# Patient Record
Sex: MALE | Race: WHITE | NOT HISPANIC OR LATINO | URBAN - METROPOLITAN AREA
[De-identification: names, ages, dates, MRNs, and addresses within clinical notes are randomized per-mention and may not be internally consistent; named-entity substitution may affect disease eponyms.]

---

## 2017-01-12 ENCOUNTER — INPATIENT (INPATIENT)
Facility: HOSPITAL | Age: 67
LOS: 3 days | Discharge: HOME HEALTH | DRG: 465 | End: 2017-01-16
Attending: STUDENT IN AN ORGANIZED HEALTH CARE EDUCATION/TRAINING PROGRAM | Admitting: STUDENT IN AN ORGANIZED HEALTH CARE EDUCATION/TRAINING PROGRAM
Payer: COMMERCIAL

## 2017-01-12 VITALS
RESPIRATION RATE: 18 BRPM | OXYGEN SATURATION: 98 % | DIASTOLIC BLOOD PRESSURE: 81 MMHG | HEART RATE: 92 BPM | SYSTOLIC BLOOD PRESSURE: 158 MMHG | TEMPERATURE: 99 F | HEIGHT: 75 IN | WEIGHT: 259.93 LBS

## 2017-01-12 DIAGNOSIS — I48.91 UNSPECIFIED ATRIAL FIBRILLATION: ICD-10-CM

## 2017-01-12 DIAGNOSIS — R60.0 LOCALIZED EDEMA: ICD-10-CM

## 2017-01-12 DIAGNOSIS — R63.8 OTHER SYMPTOMS AND SIGNS CONCERNING FOOD AND FLUID INTAKE: ICD-10-CM

## 2017-01-12 DIAGNOSIS — I10 ESSENTIAL (PRIMARY) HYPERTENSION: ICD-10-CM

## 2017-01-12 DIAGNOSIS — G62.9 POLYNEUROPATHY, UNSPECIFIED: ICD-10-CM

## 2017-01-12 DIAGNOSIS — Z41.8 ENCOUNTER FOR OTHER PROCEDURES FOR PURPOSES OTHER THAN REMEDYING HEALTH STATE: ICD-10-CM

## 2017-01-12 DIAGNOSIS — L97.514 NON-PRESSURE CHRONIC ULCER OF OTHER PART OF RIGHT FOOT WITH NECROSIS OF BONE: ICD-10-CM

## 2017-01-12 DIAGNOSIS — L03.039 CELLULITIS OF UNSPECIFIED TOE: ICD-10-CM

## 2017-01-12 LAB
ALBUMIN SERPL ELPH-MCNC: 3.3 G/DL — LOW (ref 3.4–5)
ALP SERPL-CCNC: 79 U/L — SIGNIFICANT CHANGE UP (ref 40–120)
ALT FLD-CCNC: 27 U/L — SIGNIFICANT CHANGE UP (ref 12–42)
ANION GAP SERPL CALC-SCNC: 8 MMOL/L — LOW (ref 9–16)
APTT BLD: 35.9 SEC — SIGNIFICANT CHANGE UP (ref 27.5–37.4)
AST SERPL-CCNC: 15 U/L — SIGNIFICANT CHANGE UP (ref 15–37)
BASOPHILS NFR BLD AUTO: 0.3 % — SIGNIFICANT CHANGE UP (ref 0–2)
BILIRUB SERPL-MCNC: 0.4 MG/DL — SIGNIFICANT CHANGE UP (ref 0.2–1.2)
BLD GP AB SCN SERPL QL: NEGATIVE — SIGNIFICANT CHANGE UP
BUN SERPL-MCNC: 18 MG/DL — SIGNIFICANT CHANGE UP (ref 7–23)
CALCIUM SERPL-MCNC: 9.2 MG/DL — SIGNIFICANT CHANGE UP (ref 8.5–10.5)
CHLORIDE SERPL-SCNC: 104 MMOL/L — SIGNIFICANT CHANGE UP (ref 96–108)
CO2 SERPL-SCNC: 26 MMOL/L — SIGNIFICANT CHANGE UP (ref 22–31)
CREAT SERPL-MCNC: 0.99 MG/DL — SIGNIFICANT CHANGE UP (ref 0.5–1.3)
EOSINOPHIL NFR BLD AUTO: 1.5 % — SIGNIFICANT CHANGE UP (ref 0–6)
GLUCOSE SERPL-MCNC: 145 MG/DL — HIGH (ref 70–99)
HCT VFR BLD CALC: 38.5 % — LOW (ref 39–50)
HGB BLD-MCNC: 13.4 G/DL — SIGNIFICANT CHANGE UP (ref 13–17)
INR BLD: 2.47 — HIGH (ref 0.88–1.16)
LYMPHOCYTES # BLD AUTO: 14.6 % — SIGNIFICANT CHANGE UP (ref 13–44)
MCHC RBC-ENTMCNC: 30.9 PG — SIGNIFICANT CHANGE UP (ref 27–34)
MCHC RBC-ENTMCNC: 34.8 G/DL — SIGNIFICANT CHANGE UP (ref 32–36)
MCV RBC AUTO: 88.7 FL — SIGNIFICANT CHANGE UP (ref 80–100)
MONOCYTES NFR BLD AUTO: 9.2 % — SIGNIFICANT CHANGE UP (ref 2–14)
NEUTROPHILS NFR BLD AUTO: 74.4 % — SIGNIFICANT CHANGE UP (ref 43–77)
PLATELET # BLD AUTO: 208 K/UL — SIGNIFICANT CHANGE UP (ref 150–400)
POTASSIUM SERPL-MCNC: 4.3 MMOL/L — SIGNIFICANT CHANGE UP (ref 3.5–5.3)
POTASSIUM SERPL-SCNC: 4.3 MMOL/L — SIGNIFICANT CHANGE UP (ref 3.5–5.3)
PROT SERPL-MCNC: 7.4 G/DL — SIGNIFICANT CHANGE UP (ref 6.4–8.2)
PROTHROM AB SERPL-ACNC: 27.7 SEC — HIGH (ref 10–13.1)
RBC # BLD: 4.34 M/UL — SIGNIFICANT CHANGE UP (ref 4.2–5.8)
RBC # FLD: 12.5 % — SIGNIFICANT CHANGE UP (ref 10.3–16.9)
RH IG SCN BLD-IMP: POSITIVE — SIGNIFICANT CHANGE UP
SODIUM SERPL-SCNC: 138 MMOL/L — SIGNIFICANT CHANGE UP (ref 135–145)
WBC # BLD: 12 K/UL — HIGH (ref 3.8–10.5)
WBC # FLD AUTO: 12 K/UL — HIGH (ref 3.8–10.5)

## 2017-01-12 PROCEDURE — 99285 EMERGENCY DEPT VISIT HI MDM: CPT | Mod: 25

## 2017-01-12 PROCEDURE — 99222 1ST HOSP IP/OBS MODERATE 55: CPT | Mod: GC

## 2017-01-12 PROCEDURE — 93971 EXTREMITY STUDY: CPT | Mod: 26

## 2017-01-12 PROCEDURE — 71020: CPT | Mod: 26

## 2017-01-12 PROCEDURE — 93010 ELECTROCARDIOGRAM REPORT: CPT | Mod: NC

## 2017-01-12 PROCEDURE — 73660 X-RAY EXAM OF TOE(S): CPT | Mod: 26,RT

## 2017-01-12 PROCEDURE — 73660 X-RAY EXAM OF TOE(S): CPT | Mod: 26

## 2017-01-12 RX ORDER — SODIUM CHLORIDE 9 MG/ML
3 INJECTION INTRAMUSCULAR; INTRAVENOUS; SUBCUTANEOUS ONCE
Qty: 0 | Refills: 0 | Status: COMPLETED | OUTPATIENT
Start: 2017-01-12 | End: 2017-01-12

## 2017-01-12 RX ORDER — VANCOMYCIN HCL 1 G
750 VIAL (EA) INTRAVENOUS ONCE
Qty: 0 | Refills: 0 | Status: COMPLETED | OUTPATIENT
Start: 2017-01-13 | End: 2017-01-13

## 2017-01-12 RX ORDER — WARFARIN SODIUM 2.5 MG/1
7.5 TABLET ORAL ONCE
Qty: 0 | Refills: 0 | Status: COMPLETED | OUTPATIENT
Start: 2017-01-12 | End: 2017-01-12

## 2017-01-12 RX ORDER — PIPERACILLIN AND TAZOBACTAM 4; .5 G/20ML; G/20ML
3.38 INJECTION, POWDER, LYOPHILIZED, FOR SOLUTION INTRAVENOUS EVERY 6 HOURS
Qty: 0 | Refills: 0 | Status: DISCONTINUED | OUTPATIENT
Start: 2017-01-12 | End: 2017-01-16

## 2017-01-12 RX ORDER — PIPERACILLIN AND TAZOBACTAM 4; .5 G/20ML; G/20ML
3.38 INJECTION, POWDER, LYOPHILIZED, FOR SOLUTION INTRAVENOUS ONCE
Qty: 0 | Refills: 0 | Status: COMPLETED | OUTPATIENT
Start: 2017-01-12 | End: 2017-01-12

## 2017-01-12 RX ORDER — LISINOPRIL 2.5 MG/1
20 TABLET ORAL DAILY
Qty: 0 | Refills: 0 | Status: DISCONTINUED | OUTPATIENT
Start: 2017-01-12 | End: 2017-01-13

## 2017-01-12 RX ORDER — VANCOMYCIN HCL 1 G
1500 VIAL (EA) INTRAVENOUS EVERY 12 HOURS
Qty: 0 | Refills: 0 | Status: DISCONTINUED | OUTPATIENT
Start: 2017-01-12 | End: 2017-01-13

## 2017-01-12 RX ORDER — VANCOMYCIN HCL 1 G
1000 VIAL (EA) INTRAVENOUS ONCE
Qty: 0 | Refills: 0 | Status: COMPLETED | OUTPATIENT
Start: 2017-01-12 | End: 2017-01-12

## 2017-01-12 RX ADMIN — PIPERACILLIN AND TAZOBACTAM 200 GRAM(S): 4; .5 INJECTION, POWDER, LYOPHILIZED, FOR SOLUTION INTRAVENOUS at 19:52

## 2017-01-12 RX ADMIN — SODIUM CHLORIDE 3 MILLILITER(S): 9 INJECTION INTRAMUSCULAR; INTRAVENOUS; SUBCUTANEOUS at 13:51

## 2017-01-12 RX ADMIN — PIPERACILLIN AND TAZOBACTAM 200 GRAM(S): 4; .5 INJECTION, POWDER, LYOPHILIZED, FOR SOLUTION INTRAVENOUS at 14:18

## 2017-01-12 RX ADMIN — Medication 250 MILLIGRAM(S): at 15:57

## 2017-01-12 NOTE — H&P ADULT. - PROBLEM SELECTOR PLAN 1
-Afebrile, VSS. WBC 12, ESR 36, CRP 8.40. On exam, second metatarsal with open ulcer (1 cm x 1cm), probes to bone, no purulence or bloody drainage. Podiatry on board, was debrided earlier in the day -Afebrile, VSS. WBC 12, ESR 36, CRP 8.40. On exam, second metatarsal with open ulcer (1 cm x 1cm), probes to bone, no purulence or bloody drainage. Podiatry on board, was debrided earlier in the day. XR R toe concerning for possible osteo of distal phalanx. S/o vanco 1g x1 and zosyn 3.375 x1 in ED  -f/u toe MRI to r/o osteo  -c/w empiric vanc, zosyn for now   -podiatry on board, appreciate recs  -f/u BCx  -f/u PT eval  -f/u daily CBC, ESR, CRP -Afebrile, VSS. WBC 12, ESR 36, CRP 8.40. On exam, second metatarsal with open ulcer (1 cm x 1cm), probes to bone, no purulence or bloody drainage. Podiatry on board, was debrided earlier in the day. XR R toe concerning for possible osteo of distal phalanx. S/o vanco 1g x1 and zosyn 3.375 x1 in ED  -high suspicion for osteo at this time  -f/u toe MRI to r/o osteo  -c/w empiric vanc/zosyn for now given chronicity of lesion and concern for osteo  -podiatry on board, appreciate recs  -f/u BCx  -f/u PT eval  -f/u daily CBC, ESR, CRP

## 2017-01-12 NOTE — H&P ADULT. - PROBLEM SELECTOR PLAN 2
-patient with non pitting edema to R calf. Mild erythema of R calf, with mild tenderness on palpation, 2+ pt and dp pulses  -given unilateral nature of LE edema, concern for possible DVT  -f/u duplex RLE

## 2017-01-12 NOTE — CONSULT NOTE ADULT - PROBLEM SELECTOR RECOMMENDATION 9
- Patient seen and evaluated at bedside where excisional debridement of devitalized epidermis was performed using aseptic technique and dissecting scissors and #15 blade down to dermis.  Good bleeding was present at ulcer edges post debridement. Patient tolerated the procedure well without incident.  Wound site was flushed with copious amounts of saline and cultures were obtained. Betadine was applied to wound site along with DSD and ACE.   - Radiographic / clinical findings suggestive of right 2nd digit distal phalanx OM.  -Recommend MRI for more definitive results to r/o OM to distal phalanx      - Recommend continuing empiric antibiotic therapy until cultures dictate otherwise.   -Recommend duplex ultrasound on right calf to r/o DVT as it is tender and swollen on clinical exam.   Patient exhibited adequate verbal understanding. Patient will require either conservative (6 weeks of IV antibiotics) management or surgical management.    - Attending physician to round on patient tomorrow evening to review treatment options.    - Podiatry to follow. - Patient seen and evaluated at bedside where excisional debridement of devitalized epidermis was performed using aseptic technique and dissecting scissors and #15 blade down to dermis.  Good bleeding was present at ulcer edges post debridement. Patient tolerated the procedure well without incident.  Wound site was flushed with copious amounts of saline and cultures were obtained. Betadine was applied to wound site along with DSD and ACE.   - Radiographic / clinical findings suggestive of right 2nd digit distal phalanx OM.  -Recommend MRI for more definitive results to r/o OM to distal phalanx. In event of positive MRI findings for OM, it is still reasonable to treat patient with long term IV abx considering he has good perfusion to toe  - Recommend continuing empiric antibiotic therapy until cultures dictate otherwise.   -Recommend duplex ultrasound on right calf to r/o DVT as it is tender and swollen on clinical exam.   Patient exhibited adequate verbal understanding. Patient will require either conservative (6 weeks of IV antibiotics) management or surgical management.    - Attending physician to round on patient tomorrow evening to review treatment options.    - Podiatry to follow. - Patient seen and evaluated at bedside where excisional debridement of devitalized epidermis was performed using aseptic technique and dissecting scissors and #15 blade down to dermis.  Good bleeding was present at ulcer edges post debridement. Patient tolerated the procedure well without incident.  Wound site was flushed with copious amounts of saline and cultures were obtained. Betadine was applied to wound site along with DSD and ACE.   - Radiographic / clinical findings suggestive of right 2nd digit distal phalanx OM.  -Recommend MRI for more definitive results to r/o OM to distal phalanx. In event of positive MRI findings for OM, it is still reasonable to treat patient with long term IV abx considering he has good perfusion to toe  - Recommend continuing empiric antibiotic therapy until cultures dictate otherwise.   -Recommend duplex ultrasound on right calf to r/o DVT as it is tender and swollen on clinical exam.   Patient exhibited adequate verbal understanding. Patient will require either conservative (6 weeks of IV antibiotics) management or surgical management.    -WBAT in surgical shoe  - Attending physician to round on patient tomorrow evening to review treatment options.    - Podiatry to follow.

## 2017-01-12 NOTE — H&P ADULT. - PROBLEM SELECTOR PLAN 5
-DVT: HSQ q8hrs    Dispo:  -code status: full code  -dispo: admit to [facility] -patient w/ h/o afib, on coumadin 7.5 mg daily (except for 3.725 mg on Wednesdays and Sundays). INR on admission 2.47  -c/w home coumadin 7.5 mg daily  -f/u AM INR

## 2017-01-12 NOTE — H&P ADULT. - ASSESSMENT
67 yo M, PMH chronic ulcer on R 2nd metatarsal, Afib (on coumadin) presents from home 1/12 with subacute worsening of toe ulcer, exam and imaging concerning for possible osteomyelitis of right second metatarsal, podiatry on board. 67 yo M, PMH chronic ulcer on R 2nd distal phalanx, Afib (on coumadin), HTN presents from home 1/12 with subacute worsening of toe ulcer, exam and imaging concerning for possible osteomyelitis of right second distal phalanx, podiatry on board.

## 2017-01-12 NOTE — H&P ADULT. - PROBLEM SELECTOR PLAN 3
-patient w/ h/o afib, on coumadin 7.5 mg daily (except for 3.725 mg on Wednesdays and Sundays). INR on admission 2.47  -c/w home coumadin 7.5 mg daily  -f/u AM INR -patient w/ decreased sensation b/l feet, R>L. Neuro exam otherwise WNL. No hx of DM per patient  -f/u AM HgbA1C   -continue to monitor

## 2017-01-12 NOTE — H&P ADULT. - ATTENDING COMMENTS
pt seen and examined on 1/12/2017  reviewed h&p, ekg, vs, labs, xray imaging  pt admitted for right 2nd toe ulcer/ osteomyelitis w/ R lower ext edema/ mild erythema  agree w/ PE findings  a/p:   1. R 2nd toe ulcer/ osteomyelitis: on vancomycin and zosyn; follow up podiatry recs, MRI pending

## 2017-01-12 NOTE — H&P ADULT. - EXTREMITIES COMMENTS
RLE-non pitting edema to knee, mildly erythematous, mild TTP  R 2nd metatarsal- open ulcer (1 cm x 1cm), probes to bone, no purulence or bloody drainage, but was debrided by podiatry earlier today

## 2017-01-12 NOTE — CONSULT NOTE ADULT - SUBJECTIVE AND OBJECTIVE BOX
65 y/o Male with A-Fib (on Coumadin), peripheral neuropathy, history of right foot ulcerations and a previews hospitalization for infected foot wound sent today to ED by his podiatrist (Dr. Delgado) for an infected ulcer on right 2nd digit. Pt notes that he has had this ulceration since December which he has been treating with daily wound care (mupirocin and DSD). He notes that it has been getting bigger for the past week. He states that he has pain on his right calf but not much pain on his foot. Pt denies any purulent drainage, nausea, chills, fevers, SOB, chest pain.     Allergies: no known drug or food allergies  Family history: A-fib mother  Social history: patient works as a  from home, denies drinking, denies smoking  Surgical history: removal of rectal benign lesion  ROS: negative    ICU Vital Signs Last 24 Hrs  T(C): 37.3, Max: 37.3 (01-12 @ 14:53)  T(F): 99.2, Max: 99.2 (01-12 @ 14:53)  HR: 80 (80 - 92)  BP: 119/81 (119/81 - 158/81)  BP(mean): --  ABP: --  ABP(mean): --  RR: 22 (18 - 22)  SpO2: 97% (97% - 98%)                          13.4   12.0  )-----------( 208      ( 12 Jan 2017 13:58 )             38.5   12 Jan 2017 13:58    138    |  104    |  18     ----------------------------<  145    4.3     |  26     |  0.99     Ca    9.2        12 Jan 2017 13:58    TPro  7.4    /  Alb  3.3    /  TBili  0.4    /  DBili  x      /  AST  15     /  ALT  27     /  AlkPhos  79     12 Jan 2017 13:5    PT/INR - ( 12 Jan 2017 13:58 )   PT: 27.7 sec;   INR: 2.47       PTT - ( 12 Jan 2017 13:58 )  PTT:35.9 sec    ESR: 36  CRP: 8.4    INTERPRETATION:  XR TOE(S)-RIGHT MIN 2 VIEWS DATED 1/12/2017 2:15 PM    INDICATION: 66 years-old Male with 2nd toe inf r/o osteo.    PRIOR STUDIES: Right foot radiographs dated 11/8/2006.    FINDINGS: Three views of the right second toe are submitted. Soft tissue   swelling is noted about the second digit. Suggestion of a skin/soft   tissue defect at the tip of the second digit. Cannot rule out air in the   soft tissue, or most likely artifact due to air outlining the tip of the   nail. There is mild irregularity of the terminal phalangeal tuft of the   second distal phalanx. No acute fracture or dislocation. Mild productive   changes noted at the first MTP joint.    IMPRESSION: Irregularity of the terminal phalangeal tuft of the second   distal phalanx adjacent to a soft tissue defect. Findings are concerning   for osteomyelitis. MRI is recommended for further evaluation.    Physical Exam:  65yo pleasant well nourished male, awake, alert, oriented to examiner, sitting comfortable in chair in no acute distress  Surgical shoe on right foot with dressing intact on 2nd digit  Vascular: DP/PT 2/4 b/l, brisk capillary fill time x 10, TG reversed to right foot, pedal hair diminished  Neuro: protective sensation diminished b/l  Derm: R 2nd digit ulceration that measures approximately 1cm x 1cm, + probe to bone, + malodor, no purulence, surrounding erythema and edema going up the leg, + malodor, no fluctuance, no crepitation, fibrotic borders  Musculoskeletal: b/l rectus feet with rigid right 2nd digit hammertoe deformity

## 2017-01-12 NOTE — ED PROVIDER NOTE - ATTENDING CONTRIBUTION TO CARE
I assessed the patient and discussed with him the need for IV antibiotics and the need for admission for further care. Diabetic foot infection carries significant morbility if not aggressive managed.

## 2017-01-12 NOTE — ED ADULT NURSE NOTE - OBJECTIVE STATEMENT
pt is a 67 y/o male c/o infected right toe for the past two days. was sent by podiatrist for IV abx.  pt reports this is a recurring problem. [id number] ulceration bottom side of right second toe with mild amount of swelling. pt denies any pain at this time. denies fever, SOB, chills, chest pain, n/v/d, malaise. no acute distress, VS stable, ambulated to chair,  resting comfortably, will continue to monitor.

## 2017-01-12 NOTE — H&P ADULT. - PROBLEM SELECTOR PLAN 4
FEN:  -F: none  -E: replete PRN to maintain K>4, Mg>2  -N: DASH diet -patient w/ h/o HTN, on benazapril 20 mg at home. -159 while at [facility]  -c/w lisinopril 20 mg (therapeutic interchange for home benazapril)

## 2017-01-12 NOTE — ED PROVIDER NOTE - OBJECTIVE STATEMENT
The pt is a 67 y/o M, who presents to ED c/o R toe inf x few d. Pt states that has an ulcer to toe, has had an inf to same toe in past, The pt is a 67 y/o M, who presents to ED c/o R toe inf x few d. Pt states that has an ulcer to toe, has had an inf to same toe in past, has been cleaning and dressing but wound getting bigger - seen by own podiatrist today and sent to ED for iv abx. pt states swelling to 2nd toe, + pain, + purulent d/c. Denies fevers, chills, trauma, foot pain, decreased ROM, any other c/o

## 2017-01-12 NOTE — ED PROVIDER NOTE - MEDICAL DECISION MAKING DETAILS
pt w/cellulitis / infected toe ulcer, getting worse w/tx at home, xray neg for osteo, will admit for iv abx, podiatry to follow as inpatient, labs w/leukocytosis, cultures sent, abx started

## 2017-01-12 NOTE — CONSULT NOTE ADULT - ASSESSMENT
67yo  male with right foot 2nd digit infected ulceration secondary to hammer toe deformity and peripheral neuropathy

## 2017-01-12 NOTE — ED PROVIDER NOTE - MUSCULOSKELETAL, MLM
R foot: + swelling to 2nd digit, + ulcer to tip of toe, + erythema, + warmth, no involvement of metatarsals, FROM, pedal pulses = b/l, + light touch, no ascending lymphangitis R foot: + swelling to 2nd digit, + 0.5 cm ulcer to tip of toe, + erythema, + warmth, no involvement of metatarsals, FROM, pedal pulses = b/l, + light touch, no ascending lymphangitis

## 2017-01-12 NOTE — ED ADULT TRIAGE NOTE - CHIEF COMPLAINT QUOTE
Patient c/o rt foot pain  and swelling for 24 hrs , was sent by podiatrist for IV antibiotic . Denies any fever nor chills .

## 2017-01-13 DIAGNOSIS — Z01.810 ENCOUNTER FOR PREPROCEDURAL CARDIOVASCULAR EXAMINATION: ICD-10-CM

## 2017-01-13 DIAGNOSIS — I82.512 CHRONIC EMBOLISM AND THROMBOSIS OF LEFT FEMORAL VEIN: ICD-10-CM

## 2017-01-13 DIAGNOSIS — L97.519 NON-PRESSURE CHRONIC ULCER OF OTHER PART OF RIGHT FOOT WITH UNSPECIFIED SEVERITY: ICD-10-CM

## 2017-01-13 LAB
CRP SERPL-MCNC: 9.1 MG/DL — HIGH
ERYTHROCYTE [SEDIMENTATION RATE] IN BLOOD: 16 MM/HR — SIGNIFICANT CHANGE UP
GRAM STN FLD: SIGNIFICANT CHANGE UP
HCT VFR BLD CALC: 41.4 % — SIGNIFICANT CHANGE UP (ref 39–50)
HGB BLD-MCNC: 14.3 G/DL — SIGNIFICANT CHANGE UP (ref 13–17)
MCHC RBC-ENTMCNC: 31 PG — SIGNIFICANT CHANGE UP (ref 27–34)
MCHC RBC-ENTMCNC: 34.5 G/DL — SIGNIFICANT CHANGE UP (ref 32–36)
MCV RBC AUTO: 89.8 FL — SIGNIFICANT CHANGE UP (ref 80–100)
PLATELET # BLD AUTO: 222 K/UL — SIGNIFICANT CHANGE UP (ref 150–400)
RBC # BLD: 4.61 M/UL — SIGNIFICANT CHANGE UP (ref 4.2–5.8)
RBC # FLD: 12.5 % — SIGNIFICANT CHANGE UP (ref 10.3–16.9)
SPECIMEN SOURCE: SIGNIFICANT CHANGE UP
VANCOMYCIN TROUGH SERPL-MCNC: 12.1 UG/ML — SIGNIFICANT CHANGE UP (ref 10–20)
WBC # BLD: 11.8 K/UL — HIGH (ref 3.8–10.5)
WBC # FLD AUTO: 11.8 K/UL — HIGH (ref 3.8–10.5)

## 2017-01-13 PROCEDURE — 99232 SBSQ HOSP IP/OBS MODERATE 35: CPT

## 2017-01-13 PROCEDURE — 93010 ELECTROCARDIOGRAM REPORT: CPT

## 2017-01-13 RX ORDER — VANCOMYCIN HCL 1 G
VIAL (EA) INTRAVENOUS
Qty: 0 | Refills: 0 | Status: DISCONTINUED | OUTPATIENT
Start: 2017-01-13 | End: 2017-01-14

## 2017-01-13 RX ORDER — VANCOMYCIN HCL 1 G
1500 VIAL (EA) INTRAVENOUS EVERY 12 HOURS
Qty: 0 | Refills: 0 | Status: DISCONTINUED | OUTPATIENT
Start: 2017-01-14 | End: 2017-01-14

## 2017-01-13 RX ORDER — HEPARIN SODIUM 5000 [USP'U]/ML
5000 INJECTION INTRAVENOUS; SUBCUTANEOUS EVERY 8 HOURS
Qty: 0 | Refills: 0 | Status: DISCONTINUED | OUTPATIENT
Start: 2017-01-13 | End: 2017-01-13

## 2017-01-13 RX ORDER — NEBIVOLOL HYDROCHLORIDE 5 MG/1
5 TABLET ORAL DAILY
Qty: 0 | Refills: 0 | Status: DISCONTINUED | OUTPATIENT
Start: 2017-01-13 | End: 2017-01-16

## 2017-01-13 RX ORDER — VANCOMYCIN HCL 1 G
1500 VIAL (EA) INTRAVENOUS ONCE
Qty: 0 | Refills: 0 | Status: COMPLETED | OUTPATIENT
Start: 2017-01-13 | End: 2017-01-13

## 2017-01-13 RX ORDER — BENAZEPRIL HYDROCHLORIDE 40 MG/1
20 TABLET ORAL DAILY
Qty: 0 | Refills: 0 | Status: DISCONTINUED | OUTPATIENT
Start: 2017-01-13 | End: 2017-01-16

## 2017-01-13 RX ORDER — METOPROLOL TARTRATE 50 MG
50 TABLET ORAL DAILY
Qty: 0 | Refills: 0 | Status: DISCONTINUED | OUTPATIENT
Start: 2017-01-13 | End: 2017-01-13

## 2017-01-13 RX ADMIN — Medication 300 MILLIGRAM(S): at 07:28

## 2017-01-13 RX ADMIN — Medication 250 MILLIGRAM(S): at 21:37

## 2017-01-13 RX ADMIN — Medication 150 MILLIGRAM(S): at 00:13

## 2017-01-13 RX ADMIN — PIPERACILLIN AND TAZOBACTAM 200 GRAM(S): 4; .5 INJECTION, POWDER, LYOPHILIZED, FOR SOLUTION INTRAVENOUS at 06:32

## 2017-01-13 RX ADMIN — PIPERACILLIN AND TAZOBACTAM 200 GRAM(S): 4; .5 INJECTION, POWDER, LYOPHILIZED, FOR SOLUTION INTRAVENOUS at 12:57

## 2017-01-13 RX ADMIN — PIPERACILLIN AND TAZOBACTAM 200 GRAM(S): 4; .5 INJECTION, POWDER, LYOPHILIZED, FOR SOLUTION INTRAVENOUS at 17:27

## 2017-01-13 RX ADMIN — PIPERACILLIN AND TAZOBACTAM 200 GRAM(S): 4; .5 INJECTION, POWDER, LYOPHILIZED, FOR SOLUTION INTRAVENOUS at 01:42

## 2017-01-13 NOTE — PROGRESS NOTE ADULT - PROBLEM SELECTOR PLAN 8
CHADsVasc =1  was on warfarin for hx DVT , unsure if pt still needs a/c after 15 months of therapy  c/w bystolic

## 2017-01-13 NOTE — PROGRESS NOTE ADULT - SUBJECTIVE AND OBJECTIVE BOX
PGY-1 Medicine Progress Note    Overnight events: Admitted to medical floor.    Subjective: Pt seen and examined at bedside this morning.     T(C): 37.3, Max: 37.6 (01-12 @ 22:56)  HR: 85 (75 - 92)  BP: 115/71 (115/71 - 129/80)  RR: 20 (17 - 20)  SpO2: 97% (96% - 98%)  Wt(kg): --    CAPILLARY BLOOD GLUCOSE  152 (13 Jan 2017 16:42)  166 (13 Jan 2017 13:41)  121 (13 Jan 2017 07:44)    GEN: Alert, NAD, comfortable  HEENT: PERRL/EOMI, no LAD, no mass, no pharyngeal erythema or exudate  CV: irregularly irregular, S1/S2, no murmurs  RESP: CTA bilaterally, good respiratory effort  ABD: BS+, NTND, no guarding/rebound  EXTREMITIES: WWP, pulses 2+ in all 4 extremities, RLE pitting 2+ edema, second right toe with ulcer revealing bone, wrapped in dry bandage  NEURO: A&Ox3, decreased sensation to b/l feet, otherwise no focal deficits    MEDICATIONS  (STANDING):  piperacillin/tazobactam IVPB. 3.375Gram(s) IV Intermittent every 6 hours  nebivolol 5milliGRAM(s) Oral daily  benazepril 20milliGRAM(s) Oral daily  vancomycin  IVPB  IV Intermittent   vancomycin  IVPB 1500milliGRAM(s) IV Intermittent once    MEDICATIONS  (PRN):    Allergies    No Known Allergies    Intolerances PGY-1 Medicine Progress Note    Overnight events: Admitted to medical floor.    Subjective: Pt seen and examined at bedside this morning. Denies pain of toe, just redness/swelling. Otherwise denies fever, sob, chest pain, nausea/vomiting. Has good appetite, no change in bowel movements, has good urine output, and denies difficulty walking.    T(C): 37.3, Max: 37.6 (01-12 @ 22:56)  HR: 85 (75 - 92)  BP: 115/71 (115/71 - 129/80)  RR: 20 (17 - 20)  SpO2: 97% (96% - 98%)  Wt(kg): --    CAPILLARY BLOOD GLUCOSE  152 (13 Jan 2017 16:42)  166 (13 Jan 2017 13:41)  121 (13 Jan 2017 07:44)    GEN: Alert, NAD, comfortable  HEENT: PERRL/EOMI, no LAD, no mass, no pharyngeal erythema or exudate  CV: irregularly irregular, S1/S2, no murmurs  RESP: CTA bilaterally, good respiratory effort  ABD: BS+, NTND, no guarding/rebound  EXTREMITIES: WWP, pulses 2+ in all 4 extremities, RLE pitting 2+ edema, second right toe with ulcer revealing bone, wrapped in dry bandage  NEURO: A&Ox3, decreased sensation to b/l feet, otherwise no focal deficits    MEDICATIONS  (STANDING):  piperacillin/tazobactam IVPB. 3.375Gram(s) IV Intermittent every 6 hours  nebivolol 5milliGRAM(s) Oral daily  benazepril 20milliGRAM(s) Oral daily  vancomycin  IVPB  IV Intermittent   vancomycin  IVPB 1500milliGRAM(s) IV Intermittent once    MEDICATIONS  (PRN):    Allergies    No Known Allergies    Intolerances

## 2017-01-13 NOTE — PROGRESS NOTE ADULT - SUBJECTIVE AND OBJECTIVE BOX
Patient seen and examined for right 2nd toe ulcer, present for >1 month. Was seen by outside podiatrist Dr. Delgado yesterday, and sent to the ED for admission.     LE Exam: DP/PT palpable. CFT <3 sec to all toes. Protective sensation absent. There is hammer toe contracture of all the lesser digits which is non-reducible. Right 2nd toe has distal tip ulcer measuring 1 cm in diameter and 0.6 cm deep with exposed bone. There is +purulent drainage and cellulitis up to the MTPJ level. There is dactylitis of the toe noted.     Xray Right foot toes: erosive changes of the distal phalanx.

## 2017-01-13 NOTE — PROGRESS NOTE ADULT - SUBJECTIVE AND OBJECTIVE BOX
Pt seen again after processing the information given earlier this am regarding txn; long term IV abx vs amputation. Pt notes that he is leaning towards the option of amputation so as to definitively get rid of the infection. He has no other questions or concerns    ICU Vital Signs Last 24 Hrs  T(C): 37.2, Max: 37.6 (01-12 @ 22:56)  T(F): 99, Max: 99.6 (01-12 @ 22:56)  HR: 75 (75 - 92)  BP: 121/77 (118/81 - 146/84)  BP(mean): --  ABP: --  ABP(mean): --  RR: 20 (17 - 22)  SpO2: 96% (96% - 98%)                        14.3   11.8  )-----------( 222      ( 13 Jan 2017 11:08 )             41.4   12 Jan 2017 13:58    138    |  104    |  18     ----------------------------<  145    4.3     |  26     |  0.99     Ca    9.2        12 Jan 2017 13:58    TPro  7.4    /  Alb  3.3    /  TBili  0.4    /  DBili  x      /  AST  15     /  ALT  27     /  AlkPhos  79     12 Jan 2017 13:58    Physical Exam: unchanged

## 2017-01-13 NOTE — PROGRESS NOTE ADULT - PROBLEM SELECTOR PLAN 1
right second metatarsal with open ulcer  , probes to bone  concerned for osteomyelitis  s/p debridement by podiatry   c/w vanco  and zosyn  will obtain mri foot  will need partial amputation of toe

## 2017-01-13 NOTE — PROGRESS NOTE ADULT - ASSESSMENT
67 yo M, PMH chronic ulcer on R 2nd distal phalanx, Afib (on coumadin), HTN presents from home 1/12 with subacute worsening of toe ulcer, exam and imaging concerning for possible osteomyelitis of right second distal phalanx, podiatry on board.

## 2017-01-13 NOTE — PROGRESS NOTE ADULT - PROBLEM SELECTOR PLAN 1
-Continue IV antibiotics  -Betadine gauze packing with DSD applied  -WBAT RLE in surgical shoe  -f/u MRI results to see extent of infection to better prepare for level of amp  -Pt is interested in surgical option after having some time to process the different treatment options.   -Recommend holding coumadin in preparation for OR. Will need medical clearance  -Attending available to take pt to OR Tuesday morning before 11am. Booking sheet has been faxed

## 2017-01-13 NOTE — PROGRESS NOTE ADULT - ASSESSMENT
66 year old male with idiopathic neuropathy and right 2nd toe distal tip ulcer with exposed bone, clinical osteomyelitis

## 2017-01-13 NOTE — PROGRESS NOTE ADULT - ASSESSMENT
66 year old male with idiopathic neuropathy and right 2nd toe distal tip ulcer with exposed bone, clinical osteomyelitis.   -Continue IV antibiotics  -Local wound care with betadine gauze packing  -Surgical shoe, WBAT RLE  -f/u MRI results  -Discussed with patient regarding long term IV antibiotics vs amputation for definitive treatment. He is still processing the information for now and he is undecided  -Consider holding coumadin in anticipation of possible OR. Will need medical clearance if agrees to proceed with amputation.

## 2017-01-13 NOTE — PROGRESS NOTE ADULT - PROBLEM SELECTOR PLAN 1
With leukocytosis on admission but not meeting sepsis criteria. Elevated ESR/CRP. On exam, second metatarsal with open ulcer (1 cm x 1cm), probes to bone, no purulence or bloody drainage. Podiatry on board, toe debrided on 1/12. Xray R toe concerning for possible osteo of distal phalanx.  - Continue Vanc/Zosyn (day 2)  - high suspicion for OM, f/up MRI of right forefoot  - F/up podiatry recs. Will hold coumadin over weekend to prepare for amputation of toe next week.  - f/u BCx. Wound cx - gram negative rods, f/up speciation  - Discontinued PT consult as pt w/o difficulty walking

## 2017-01-13 NOTE — PROGRESS NOTE ADULT - ASSESSMENT
65 yo M, PMH chronic ulcer on R 2nd distal phalanx, Afib (on coumadin), HTN presents from home 1/12 with subacute worsening of toe ulcer, exam and imaging concerning for possible osteomyelitis of right second distal phalanx, podiatry on board. 65 yo M, PMH chronic ulcer on R 2nd distal phalanx, Afib (on coumadin), HTN presents from home   with toe ulcer bleeding and purulence.

## 2017-01-14 DIAGNOSIS — I48.91 UNSPECIFIED ATRIAL FIBRILLATION: ICD-10-CM

## 2017-01-14 DIAGNOSIS — R73.03 PREDIABETES: ICD-10-CM

## 2017-01-14 DIAGNOSIS — I10 ESSENTIAL (PRIMARY) HYPERTENSION: ICD-10-CM

## 2017-01-14 LAB
ANION GAP SERPL CALC-SCNC: 6 MMOL/L — LOW (ref 9–16)
APPEARANCE UR: CLEAR — SIGNIFICANT CHANGE UP
APTT BLD: 36.1 SEC — SIGNIFICANT CHANGE UP (ref 27.5–37.4)
BACTERIA # UR AUTO: SIGNIFICANT CHANGE UP /HPF
BILIRUB UR-MCNC: NEGATIVE — SIGNIFICANT CHANGE UP
BUN SERPL-MCNC: 13 MG/DL — SIGNIFICANT CHANGE UP (ref 7–23)
CALCIUM SERPL-MCNC: 8.5 MG/DL — SIGNIFICANT CHANGE UP (ref 8.5–10.5)
CHLORIDE SERPL-SCNC: 103 MMOL/L — SIGNIFICANT CHANGE UP (ref 96–108)
CO2 SERPL-SCNC: 28 MMOL/L — SIGNIFICANT CHANGE UP (ref 22–31)
COLOR SPEC: YELLOW — SIGNIFICANT CHANGE UP
CREAT SERPL-MCNC: 0.97 MG/DL — SIGNIFICANT CHANGE UP (ref 0.5–1.3)
DIFF PNL FLD: (no result)
EPI CELLS # UR: SIGNIFICANT CHANGE UP /HPF
GLUCOSE SERPL-MCNC: 118 MG/DL — HIGH (ref 70–99)
GLUCOSE UR QL: NEGATIVE — SIGNIFICANT CHANGE UP
HBA1C BLD-MCNC: 6.4 % — HIGH (ref 4.8–5.6)
HCT VFR BLD CALC: 39.7 % — SIGNIFICANT CHANGE UP (ref 39–50)
HGB BLD-MCNC: 13.5 G/DL — SIGNIFICANT CHANGE UP (ref 13–17)
INR BLD: 2.31 — HIGH (ref 0.88–1.16)
KETONES UR-MCNC: NEGATIVE — SIGNIFICANT CHANGE UP
LEUKOCYTE ESTERASE UR-ACNC: NEGATIVE — SIGNIFICANT CHANGE UP
MAGNESIUM SERPL-MCNC: 2.3 MG/DL — SIGNIFICANT CHANGE UP (ref 1.6–2.4)
MCHC RBC-ENTMCNC: 30.5 PG — SIGNIFICANT CHANGE UP (ref 27–34)
MCHC RBC-ENTMCNC: 34 G/DL — SIGNIFICANT CHANGE UP (ref 32–36)
MCV RBC AUTO: 89.6 FL — SIGNIFICANT CHANGE UP (ref 80–100)
NITRITE UR-MCNC: NEGATIVE — SIGNIFICANT CHANGE UP
PH UR: 6 — SIGNIFICANT CHANGE UP (ref 4–8)
PLATELET # BLD AUTO: 220 K/UL — SIGNIFICANT CHANGE UP (ref 150–400)
POTASSIUM SERPL-MCNC: 4.3 MMOL/L — SIGNIFICANT CHANGE UP (ref 3.5–5.3)
POTASSIUM SERPL-SCNC: 4.3 MMOL/L — SIGNIFICANT CHANGE UP (ref 3.5–5.3)
PROT UR-MCNC: NEGATIVE MG/DL — SIGNIFICANT CHANGE UP
PROTHROM AB SERPL-ACNC: 25.9 SEC — HIGH (ref 10–13.1)
RBC # BLD: 4.43 M/UL — SIGNIFICANT CHANGE UP (ref 4.2–5.8)
RBC # FLD: 12.4 % — SIGNIFICANT CHANGE UP (ref 10.3–16.9)
RBC CASTS # UR COMP ASSIST: < 5 /HPF — SIGNIFICANT CHANGE UP
SODIUM SERPL-SCNC: 137 MMOL/L — SIGNIFICANT CHANGE UP (ref 135–145)
SP GR SPEC: 1.01 — SIGNIFICANT CHANGE UP (ref 1–1.03)
UROBILINOGEN FLD QL: 0.2 E.U./DL — SIGNIFICANT CHANGE UP
VANCOMYCIN TROUGH SERPL-MCNC: 11.5 UG/ML — SIGNIFICANT CHANGE UP (ref 10–20)
VIT B12 SERPL-MCNC: 455 PG/ML — SIGNIFICANT CHANGE UP (ref 243–894)
WBC # BLD: 11.6 K/UL — HIGH (ref 3.8–10.5)
WBC # FLD AUTO: 11.6 K/UL — HIGH (ref 3.8–10.5)
WBC UR QL: < 5 /HPF — SIGNIFICANT CHANGE UP

## 2017-01-14 PROCEDURE — 99232 SBSQ HOSP IP/OBS MODERATE 35: CPT

## 2017-01-14 RX ORDER — VANCOMYCIN HCL 1 G
1750 VIAL (EA) INTRAVENOUS EVERY 12 HOURS
Qty: 0 | Refills: 0 | Status: DISCONTINUED | OUTPATIENT
Start: 2017-01-15 | End: 2017-01-16

## 2017-01-14 RX ORDER — VANCOMYCIN HCL 1 G
VIAL (EA) INTRAVENOUS
Qty: 0 | Refills: 0 | Status: DISCONTINUED | OUTPATIENT
Start: 2017-01-14 | End: 2017-01-16

## 2017-01-14 RX ORDER — VANCOMYCIN HCL 1 G
1750 VIAL (EA) INTRAVENOUS ONCE
Qty: 0 | Refills: 0 | Status: COMPLETED | OUTPATIENT
Start: 2017-01-14 | End: 2017-01-14

## 2017-01-14 RX ADMIN — PIPERACILLIN AND TAZOBACTAM 200 GRAM(S): 4; .5 INJECTION, POWDER, LYOPHILIZED, FOR SOLUTION INTRAVENOUS at 12:05

## 2017-01-14 RX ADMIN — PIPERACILLIN AND TAZOBACTAM 200 GRAM(S): 4; .5 INJECTION, POWDER, LYOPHILIZED, FOR SOLUTION INTRAVENOUS at 18:10

## 2017-01-14 RX ADMIN — Medication 250 MILLIGRAM(S): at 09:49

## 2017-01-14 RX ADMIN — PIPERACILLIN AND TAZOBACTAM 200 GRAM(S): 4; .5 INJECTION, POWDER, LYOPHILIZED, FOR SOLUTION INTRAVENOUS at 06:18

## 2017-01-14 RX ADMIN — PIPERACILLIN AND TAZOBACTAM 200 GRAM(S): 4; .5 INJECTION, POWDER, LYOPHILIZED, FOR SOLUTION INTRAVENOUS at 00:50

## 2017-01-14 RX ADMIN — Medication 250 MILLIGRAM(S): at 21:39

## 2017-01-14 RX ADMIN — BENAZEPRIL HYDROCHLORIDE 20 MILLIGRAM(S): 40 TABLET ORAL at 21:33

## 2017-01-14 RX ADMIN — NEBIVOLOL HYDROCHLORIDE 5 MILLIGRAM(S): 5 TABLET ORAL at 21:33

## 2017-01-14 NOTE — PROGRESS NOTE ADULT - SUBJECTIVE AND OBJECTIVE BOX
PROGRESS NOTE   Patient is a 66y old  Male who presents with a chief complaint of r/o osteo (12 Jan 2017 17:16)    Pt comfortably lying in bed. Denies any foot pain. Dressing clean dry intact.       HPI:  67 yo M with PMH chronic ulcer on R 2nd distal phalanx, Afib (on coumadin), HTN presented from home 1/12 on advice of patient's outpatient podiatrist. Patient has h/o of prior ulcers on b/l feet. Has had ulcer on R second metatarsal since 12/2016. Had been using topical mupirocin and daily dressing changes for ulcer per podiatrist. In last 2 days, patient had been noticing worsening appearance of toe in absence of pain, bleeding, drainage or other sx. Saw outpatient podiatrist today who recommended that patient come to ED. ROS negative for fevers, chills, pain of foot, difficulty ambulating, weakness. ROS positive for RLE edema. Podiatry service consulted by ED.     In ED: VS Tmax 99.2, -158/81, HR 80-92, RR 18-22 O2 sat 97-98% on RA. Received vancomycin 1g x 1 and zosyn 3.375 g x1 in ED. (12 Jan 2017 17:16)      Vital Signs Last 24 Hrs  T(C): 37, Max: 37.3 (01-13 @ 16:13)  T(F): 98.6, Max: 99.1 (01-13 @ 16:13)  HR: 64 (64 - 85)  BP: 101/69 (101/69 - 122/78)  BP(mean): --  RR: 18 (18 - 20)  SpO2: 99% (96% - 99%)                          13.5   11.6  )-----------( 220      ( 14 Jan 2017 07:08 )             39.7               14 Jan 2017 07:07    137    |  103    |  13     ----------------------------<  118    4.3     |  28     |  0.97     Ca    8.5        14 Jan 2017 07:07  Mg     2.3       14 Jan 2017 07:07    TPro  7.4    /  Alb  3.3    /  TBili  0.4    /  DBili  x      /  AST  15     /  ALT  27     /  AlkPhos  79     12 Jan 2017 13:58      PHYSICAL EXAM  GEN: ROSALIE DE LUNA is a pleasant well-nourished, well developed 66y Male in no acute distress, alert awake, and oriented to person, place and time.   LE Focused:  Right foot, Erythema limited to R 2nd toe.  ulcer still draining purulence. 2nd digit tender to palpation.     Vancomycin Level, Trough (01.13.17 @ 16:46)    Vancomycin Level, Trough: 12.1: Vancomycin trough levels should be rapidly reached and maintained at  15-20 ug/ml for life threatening MRSA  infections such as sepsis, endocarditis, osteomyelitis and pneumonia. A  first trough level should be drawn  before the 3rd or 4th dose.  Risk12.1:  of renal toxicity is increased for levels >15 ug/ml, in patients on  other nephrotoxic drugs, who are  hemodynamically unstable, have unstable renal function, or are on  Vancomycin therapy for >14 days. Renal function with  creatinine levels should b12.1: e monitored for those patients. ug/mL    Culture - Other (01.12.17 @ 17:34)    Gram Stain:   Numerous Gram Negative Rods  Few Gram Positive Cocci in Pairs and Chains  Rare White blood cells    Specimen Source: .Other Right 2nd toe ulcer    Culture Results:   Numerous Gram Negative Rods  Identification and susceptibility to follow.    Prothrombin Time and INR, Plasma in AM (01.14.17 @ 07:08)    Prothrombin Time, Plasma: 25.9 sec    INR: 2.31: An INR within the range of 2.00 to 3.00 is recommended for patients with  deep vein thrombosis, pulmonary embolism, atrial fibrillation and tissue  valves.  An INR within the range of 2.50 to 3.50 is recommended for most patients  with artificial valv2.31: es.

## 2017-01-14 NOTE — PROGRESS NOTE ADULT - PROBLEM SELECTOR PLAN 6
FEN:  -F: none  -E: replete PRN to maintain K>4, Mg>2  -N: DASH diet
-patient w/ decreased sensation b/l feet, R>L. Neuro exam otherwise WNL.   No hx of DM    -f/u  HgbA1C , b12
FEN:  -F: none  -E: replete PRN to maintain K>4, Mg>2  -N: DASH diet

## 2017-01-14 NOTE — PROGRESS NOTE ADULT - PROBLEM SELECTOR PLAN 1
and cellulitis; cont. abx, WBAT in surgical shoe, wound care per Podiatry recs; amputation planned pending MRI

## 2017-01-14 NOTE — PROGRESS NOTE ADULT - PROBLEM SELECTOR PROBLEM 5
Atrial fibrillation
Atrial fibrillation
Chronic deep vein thrombosis (DVT) of femoral vein of left lower extremity

## 2017-01-14 NOTE — PROGRESS NOTE ADULT - PROBLEM SELECTOR PLAN 7
VTE ppx: INR therapeutic due to coumadin.    Dispo: Continue to monitor on RMF and plan for discharge pending clinical improvement.  FULL CODE
VTE ppx: INR therapeutic currently, holding coumadin.    Dispo: Continue to monitor on RMF and plan for discharge pending clinical improvement.  FULL CODE
c/w benazapril , bystolic (home doses)  hold eplerenone

## 2017-01-14 NOTE — PROGRESS NOTE ADULT - PROBLEM SELECTOR PLAN 1
-Continue IV antibiotics   -Betadine gauze packing with DSD applied  -WBAT RLE in surgical shoe  -f/u MRI results to see extent of infection to better prepare for level of amp  -Pt is pernellaviley scheduled for tuesday 730 AM partial toe amputation. If INR drops below 1.4 and MRI  read can likely proceed with surgery at an earlier date.   - coumadin held   - medical clearance in chart   -Attending available to take pt to OR Tuesday morning before 11am. Booking sheet has been faxed

## 2017-01-14 NOTE — PROGRESS NOTE ADULT - PROBLEM SELECTOR PROBLEM 6
Nutrition, metabolism, and development symptoms
Neuropathy
Nutrition, metabolism, and development symptoms

## 2017-01-14 NOTE — PROGRESS NOTE ADULT - PROBLEM SELECTOR PLAN 5
CHADSVASC of 1, on coumadin 7.5 mg daily (except for 3.725 mg on Wednesdays and Sundays). INR on admission 2.47. Takes nebivolol at home.  - Continue home nebivolol 5mg daily for rate control  - Hold coumadin with goal INR < 1.5 for possible toe amputation next week  - f/up AM INR  - No need for heparin gtt given low CHADSVASC  - Start HSQ for VTE ppx once INR < 2
CHADSVASC of 1, on coumadin 7.5 mg daily (except for 3.725 mg on Wednesdays and Sundays). INR on admission 2.47. Takes nebivolol at home.  - Continue home nebivolol 5mg daily for rate control  - Hold coumadin with goal INR < 1.5 for possible toe amputation next week  - f/up AM INR  - No need for heparin gtt given low CHADSVASC  - Start HSQ for VTE ppx once INR < 2
provoked dvt , dx'd nov 2015  warfarin held

## 2017-01-14 NOTE — PROGRESS NOTE ADULT - PROBLEM SELECTOR PLAN 1
With leukocytosis on admission but not meeting sepsis criteria. Elevated ESR/CRP. On exam, second metatarsal with open ulcer (1 cm x 1cm), probes to bone, no purulence or bloody drainage. Podiatry on board, toe debrided further today. Xray R toe concerning for possible osteo of distal phalanx.  - Continue Vanc/Zosyn (day 3, started 1/13/17)  - high suspicion for OM, planned for MRI of right forefoot  - F/up podiatry recs. Will hold coumadin over weekend to prepare for amputation of toe next week.  - f/u BCx - ngtd. Wound cx - grew multiple darian. With leukocytosis on admission but not meeting sepsis criteria. Elevated ESR/CRP. On exam, second metatarsal with open ulcer (1 cm x 1cm), probes to bone, no purulence or bloody drainage. Podiatry on board, toe debrided further today. Xray R toe concerning for possible osteo of distal phalanx.  - Continue Vanc/Zosyn (day 3, started 1/13/17)  - high suspicion for OM, planned for MRI of right forefoot  - F/up podiatry recs. Will hold coumadin over weekend to prepare for amputation of toe next week. Tentatively planned for OR on Tuesday 1/17/17 in AM. NPO after midnight on 1/16.  - f/u BCx - ngtd. Wound cx - grew multiple darian.

## 2017-01-14 NOTE — PROGRESS NOTE ADULT - SUBJECTIVE AND OBJECTIVE BOX
Patient is a 66y old  Male who presents with a chief complaint of r/o osteo (2017 17:16)      INTERVAL HPI/OVERNIGHT EVENTS:    Pt. feels well; R foot pain controlled, swelling decreased, no F/C    Review of Systems: 12 point review of systems otherwise negative    MEDICATIONS  (STANDING):  piperacillin/tazobactam IVPB. 3.375Gram(s) IV Intermittent every 6 hours  nebivolol 5milliGRAM(s) Oral daily  benazepril 20milliGRAM(s) Oral daily  vancomycin  IVPB  IV Intermittent   vancomycin  IVPB 1500milliGRAM(s) IV Intermittent every 12 hours    MEDICATIONS  (PRN):      Allergies    No Known Allergies    Intolerances          Vital Signs Last 24 Hrs  T(C): 36.8, Max: 37.1 ( @ 21:32)  T(F): 98.2, Max: 98.8 ( @ 21:32)  HR: 72 (64 - 79)  BP: 121/80 (101/69 - 122/78)  BP(mean): --  RR: 18 (18 - 18)  SpO2: 99% (98% - 99%)  CAPILLARY BLOOD GLUCOSE      I & Os for current day (as of  @ 17:35)  =============================================  IN: 500 ml / OUT: 0 ml / NET: 500 ml      Physical Exam:    Daily     Daily   General:  Well appearing, NAD, non-toxic  Extremities: R foot dressing C/D/I  Neuro:  AAOx3    LABS:                        13.5   11.6  )-----------( 220      ( 2017 07:08 )             39.7     2017 07:07    137    |  103    |  13     ----------------------------<  118    4.3     |  28     |  0.97     Ca    8.5        2017 07:07  Mg     2.3       2017 07:07      PT/INR - ( 2017 07:08 )   PT: 25.9 sec;   INR: 2.31          PTT - ( 2017 07:08 )  PTT:36.1 sec  Urinalysis Basic - ( 2017 10:43 )    Color: Yellow / Appearance: Clear / S.010 / pH: x  Gluc: x / Ketone: NEGATIVE  / Bili: NEGATIVE / Urobili: 0.2 E.U./dL   Blood: x / Protein: NEGATIVE mg/dL / Nitrite: NEGATIVE   Leuk Esterase: NEGATIVE / RBC: < 5 /HPF / WBC < 5 /HPF   Sq Epi: x / Non Sq Epi: Rare /HPF / Bacteria: None Seen /HPF          RADIOLOGY & ADDITIONAL TESTS:    ---------------------------------------------------------------------------  I personally reviewed: [  ]EKG   [  ]CXR    [  ] CT    [  ]Other  ---------------------------------------------------------------------------  PLEASE CHECK WHEN PRESENT:     [  ]Heart Failure     [  ] Acute     [  ] Acute on Chronic     [  ] Chronic  -------------------------------------------------------------------     [  ]Diastolic [HFpEF]     [  ]Systolic [HFrEF]     [  ]Combined [HFpEF & HFrEF]     [  ]Other:  -------------------------------------------------------------------  [  ]JUAN CARLOS     [  ]ATN     [  ]Reneal Medullary Necrosis     [  ]Renal Cortical Necrosis     [  ]Other Pathological Lesions:    [  ]CKD 1  [  ]CKD 2  [  ]CKD 3  [  ]CKD 4  [  ]CKD 5  [  ]Other  -------------------------------------------------------------------  [  ]Other/Unspecified:    --------------------------------------------------------------------    Abdominal Nutritional Status  [  ]Malnutrition: See Nutrition Note  [  ]Cachexia  [  ]Other:   [  ]Supplement Ordered:  [  ]Morbid Obesity (BMI >=40]

## 2017-01-15 DIAGNOSIS — M86.171 OTHER ACUTE OSTEOMYELITIS, RIGHT ANKLE AND FOOT: ICD-10-CM

## 2017-01-15 LAB
ANION GAP SERPL CALC-SCNC: 11 MMOL/L — SIGNIFICANT CHANGE UP (ref 9–16)
BASOPHILS NFR BLD AUTO: 0.7 % — SIGNIFICANT CHANGE UP (ref 0–2)
BLD GP AB SCN SERPL QL: NEGATIVE — SIGNIFICANT CHANGE UP
BUN SERPL-MCNC: 14 MG/DL — SIGNIFICANT CHANGE UP (ref 7–23)
CALCIUM SERPL-MCNC: 8.7 MG/DL — SIGNIFICANT CHANGE UP (ref 8.5–10.5)
CHLORIDE SERPL-SCNC: 104 MMOL/L — SIGNIFICANT CHANGE UP (ref 96–108)
CO2 SERPL-SCNC: 24 MMOL/L — SIGNIFICANT CHANGE UP (ref 22–31)
CREAT SERPL-MCNC: 0.81 MG/DL — SIGNIFICANT CHANGE UP (ref 0.5–1.3)
EOSINOPHIL NFR BLD AUTO: 3.9 % — SIGNIFICANT CHANGE UP (ref 0–6)
GLUCOSE SERPL-MCNC: 120 MG/DL — HIGH (ref 70–99)
HCT VFR BLD CALC: 39 % — SIGNIFICANT CHANGE UP (ref 39–50)
HGB BLD-MCNC: 13.3 G/DL — SIGNIFICANT CHANGE UP (ref 13–17)
INR BLD: 1.61 — HIGH (ref 0.88–1.16)
INR BLD: 1.65 — HIGH (ref 0.88–1.16)
LYMPHOCYTES # BLD AUTO: 22.3 % — SIGNIFICANT CHANGE UP (ref 13–44)
MAGNESIUM SERPL-MCNC: 2.3 MG/DL — SIGNIFICANT CHANGE UP (ref 1.6–2.4)
MCHC RBC-ENTMCNC: 30.4 PG — SIGNIFICANT CHANGE UP (ref 27–34)
MCHC RBC-ENTMCNC: 34.1 G/DL — SIGNIFICANT CHANGE UP (ref 32–36)
MCV RBC AUTO: 89.2 FL — SIGNIFICANT CHANGE UP (ref 80–100)
MONOCYTES NFR BLD AUTO: 8.2 % — SIGNIFICANT CHANGE UP (ref 2–14)
NEUTROPHILS NFR BLD AUTO: 64.9 % — SIGNIFICANT CHANGE UP (ref 43–77)
PLATELET # BLD AUTO: 228 K/UL — SIGNIFICANT CHANGE UP (ref 150–400)
POTASSIUM SERPL-MCNC: 4.2 MMOL/L — SIGNIFICANT CHANGE UP (ref 3.5–5.3)
POTASSIUM SERPL-SCNC: 4.2 MMOL/L — SIGNIFICANT CHANGE UP (ref 3.5–5.3)
PROTHROM AB SERPL-ACNC: 18 SEC — HIGH (ref 10–13.1)
PROTHROM AB SERPL-ACNC: 18.4 SEC — HIGH (ref 10–13.1)
RBC # BLD: 4.37 M/UL — SIGNIFICANT CHANGE UP (ref 4.2–5.8)
RBC # FLD: 12.3 % — SIGNIFICANT CHANGE UP (ref 10.3–16.9)
RH IG SCN BLD-IMP: POSITIVE — SIGNIFICANT CHANGE UP
SODIUM SERPL-SCNC: 139 MMOL/L — SIGNIFICANT CHANGE UP (ref 135–145)
WBC # BLD: 9.9 K/UL — SIGNIFICANT CHANGE UP (ref 3.8–10.5)
WBC # FLD AUTO: 9.9 K/UL — SIGNIFICANT CHANGE UP (ref 3.8–10.5)

## 2017-01-15 PROCEDURE — 73720 MRI LWR EXTREMITY W/O&W/DYE: CPT | Mod: 26,RT

## 2017-01-15 PROCEDURE — 99233 SBSQ HOSP IP/OBS HIGH 50: CPT

## 2017-01-15 RX ORDER — ACETAMINOPHEN 500 MG
650 TABLET ORAL EVERY 6 HOURS
Qty: 0 | Refills: 0 | Status: DISCONTINUED | OUTPATIENT
Start: 2017-01-15 | End: 2017-01-16

## 2017-01-15 RX ORDER — ASCORBIC ACID 60 MG
100 TABLET,CHEWABLE ORAL DAILY
Qty: 0 | Refills: 0 | Status: DISCONTINUED | OUTPATIENT
Start: 2017-01-15 | End: 2017-01-16

## 2017-01-15 RX ORDER — MORPHINE SULFATE 50 MG/1
2 CAPSULE, EXTENDED RELEASE ORAL EVERY 4 HOURS
Qty: 0 | Refills: 0 | Status: DISCONTINUED | OUTPATIENT
Start: 2017-01-15 | End: 2017-01-16

## 2017-01-15 RX ADMIN — BENAZEPRIL HYDROCHLORIDE 20 MILLIGRAM(S): 40 TABLET ORAL at 22:27

## 2017-01-15 RX ADMIN — Medication 250 MILLIGRAM(S): at 13:23

## 2017-01-15 RX ADMIN — PIPERACILLIN AND TAZOBACTAM 200 GRAM(S): 4; .5 INJECTION, POWDER, LYOPHILIZED, FOR SOLUTION INTRAVENOUS at 19:00

## 2017-01-15 RX ADMIN — PIPERACILLIN AND TAZOBACTAM 200 GRAM(S): 4; .5 INJECTION, POWDER, LYOPHILIZED, FOR SOLUTION INTRAVENOUS at 06:47

## 2017-01-15 RX ADMIN — NEBIVOLOL HYDROCHLORIDE 5 MILLIGRAM(S): 5 TABLET ORAL at 22:24

## 2017-01-15 RX ADMIN — PIPERACILLIN AND TAZOBACTAM 200 GRAM(S): 4; .5 INJECTION, POWDER, LYOPHILIZED, FOR SOLUTION INTRAVENOUS at 12:17

## 2017-01-15 RX ADMIN — PIPERACILLIN AND TAZOBACTAM 200 GRAM(S): 4; .5 INJECTION, POWDER, LYOPHILIZED, FOR SOLUTION INTRAVENOUS at 00:20

## 2017-01-15 NOTE — DIETITIAN INITIAL EVALUATION ADULT. - OTHER INFO
pt with hx of chronic toe ulcer is now admitted for worsening of the ulcer + cellulitis. pt is currently NPO @ 12 however noted with prior order for DASH diet. RN reports pt with good PO intake while on PO diet & denies issues chewing/swallowing @ this time. pt currently noted with A1c of 6.4%, per EMR "Per patient, last HbA1c was 7 and pt lost weight through diet/exercise." NKFA. High Nutrition Risk.

## 2017-01-15 NOTE — PROGRESS NOTE ADULT - PROBLEM SELECTOR PLAN 1
Podiatry following, plan for amputation of R 2nd toe later today; cont. Vanc + Zosyn, wound care per recs, f/u cultures and final MRI read

## 2017-01-15 NOTE — PROGRESS NOTE ADULT - SUBJECTIVE AND OBJECTIVE BOX
Patient is a 66y old  Male who presents with a chief complaint of r/o osteo (2017 17:16)      INTERVAL HPI/OVERNIGHT EVENTS:    No new complaints; c/o reduced R foot swelling; no pain or F/C    Review of Systems: 12 point review of systems otherwise negative    MEDICATIONS  (STANDING):  piperacillin/tazobactam IVPB. 3.375Gram(s) IV Intermittent every 6 hours  nebivolol 5milliGRAM(s) Oral daily  benazepril 20milliGRAM(s) Oral daily  vancomycin  IVPB 1750milliGRAM(s) IV Intermittent every 12 hours  vancomycin  IVPB  IV Intermittent     MEDICATIONS  (PRN):      Allergies    No Known Allergies    Intolerances          Vital Signs Last 24 Hrs  T(C): 36.9, Max: 36.9 ( @ 20:24)  T(F): 98.4, Max: 98.4 ( @ 20:24)  HR: 65 (58 - 87)  BP: 113/73 (109/73 - 151/81)  BP(mean): --  RR: 18 (17 - 18)  SpO2: 100% (97% - 100%)  CAPILLARY BLOOD GLUCOSE      I & Os for current day (as of 01-15 @ 14:09)  =============================================  IN: 500 ml / OUT: 0 ml / NET: 500 ml      Physical Exam:    Daily     Daily   General:  Well appearing, NAD, non-toxic  HEENT:  MMM  Abdomen:  Soft, NT ND  Extremities:  R foot dressing C/D/I  Skin: WWP  Neuro:  AAOx3    LABS:                        13.3   9.9   )-----------( 228      ( 15 Shan 2017 08:37 )             39.0     15 Shan 2017 08:37    139    |  104    |  14     ----------------------------<  120    4.2     |  24     |  0.81     Ca    8.7        15 Shan 2017 08:37  Mg     2.3       15 Shan 2017 08:37      PT/INR - ( 15 Shan 2017 13:26 )   PT: 18.0 sec;   INR: 1.61          PTT - ( 2017 07:08 )  PTT:36.1 sec  Urinalysis Basic - ( 2017 10:43 )    Color: Yellow / Appearance: Clear / S.010 / pH: x  Gluc: x / Ketone: NEGATIVE  / Bili: NEGATIVE / Urobili: 0.2 E.U./dL   Blood: x / Protein: NEGATIVE mg/dL / Nitrite: NEGATIVE   Leuk Esterase: NEGATIVE / RBC: < 5 /HPF / WBC < 5 /HPF   Sq Epi: x / Non Sq Epi: Rare /HPF / Bacteria: None Seen /HPF          RADIOLOGY & ADDITIONAL TESTS:    ---------------------------------------------------------------------------  I personally reviewed: [  ]EKG   [  ]CXR    [  ] CT    [  ]Other  ---------------------------------------------------------------------------  PLEASE CHECK WHEN PRESENT:     [  ]Heart Failure     [  ] Acute     [  ] Acute on Chronic     [  ] Chronic  -------------------------------------------------------------------     [  ]Diastolic [HFpEF]     [  ]Systolic [HFrEF]     [  ]Combined [HFpEF & HFrEF]     [  ]Other:  -------------------------------------------------------------------  [  ]JUAN CARLOS     [  ]ATN     [  ]Reneal Medullary Necrosis     [  ]Renal Cortical Necrosis     [  ]Other Pathological Lesions:    [  ]CKD 1  [  ]CKD 2  [  ]CKD 3  [  ]CKD 4  [  ]CKD 5  [  ]Other  -------------------------------------------------------------------  [  ]Other/Unspecified:    --------------------------------------------------------------------    Abdominal Nutritional Status  [  ]Malnutrition: See Nutrition Note  [  ]Cachexia  [  ]Other:   [  ]Supplement Ordered:  [  ]Morbid Obesity (BMI >=40]

## 2017-01-15 NOTE — BRIEF OPERATIVE NOTE - OPERATION/FINDINGS
Distal phalanx was noted to be grossly infected. Toe disarticualted at proximal IPJ.  Proximal phalanx was healthy looking with clean bleeding bone. Deep wound cultures sent

## 2017-01-15 NOTE — PROGRESS NOTE ADULT - SUBJECTIVE AND OBJECTIVE BOX
PRE-OP NOTE  Surgeon. Dr. Willson   Assistant. 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   Planned Procedure:  Right 2nd toe amputation   Scheduled Date / Time:  1/15/17   Indication:  Osteomyelitis   Labs:                       13.3   9.9   )-----------( 228      ( 15 Shan 2017 08:37 )             39.0   15 Shan 2017 08:37    139    |  104    |  14     ----------------------------<  120    4.2     |  24     |  0.81     Ca    8.7        15 Shan 2017 08:37  Mg     2.3       15 Shan 2017 08:37    Prothrombin Time and INR, Plasma (01.15.17 @ 13:26)    Prothrombin Time, Plasma: 18.0 sec    INR: 1.61: An INR within the range of 2.00 to 3.00 is recommended for patients with  deep vein thrombosis, pulmonary embolism, atrial fibrillation and tissue  valves.  An INR within the range of 2.50 to 3.50 is recommended for most patients  with artificial valv1.61: es.          Vitals: Vital Signs Last 24 Hrs  T(C): 36.9, Max: 36.9 (01-14 @ 20:24)  T(F): 98.4, Max: 98.4 (01-14 @ 20:24)  HR: 65 (58 - 87)  BP: 113/73 (109/73 - 151/81)  BP(mean): --  RR: 18 (17 - 18)  SpO2: 100% (97% - 100%)    PE:  R 2nd toe ulcer w/ +PTB   Official CXR reading: (On Chart)   Official EKG reading: (On Chart)  Type and Cross/Screen: on chart   NPO after MN  Antibiotics: Vancomycin/ Zosyn   Anesthesia evaluation (on chart)  Operative Consent (on chart)

## 2017-01-15 NOTE — DIETITIAN INITIAL EVALUATION ADULT. - PROBLEM SELECTOR PLAN 3
-patient w/ decreased sensation b/l feet, R>L. Neuro exam otherwise WNL. No hx of DM per patient  -f/u AM HgbA1C   -continue to monitor

## 2017-01-15 NOTE — DIETITIAN INITIAL EVALUATION ADULT. - ENERGY NEEDS
Height: 6 feet 3 inches, Weight (Current): 260 pounds,  pounds +/-10%, %%, BMI 32.5    IBW used to calculate energy needs due to pt's current body weight exceeding 120% of IBW

## 2017-01-15 NOTE — DIETITIAN INITIAL EVALUATION ADULT. - PROBLEM SELECTOR PLAN 4
-patient w/ h/o HTN, on benazapril 20 mg at home. -159 while at [facility]  -c/w lisinopril 20 mg (therapeutic interchange for home benazapril)

## 2017-01-15 NOTE — DIETITIAN INITIAL EVALUATION ADULT. - PROBLEM SELECTOR PLAN 1
-Afebrile, VSS. WBC 12, ESR 36, CRP 8.40. On exam, second metatarsal with open ulcer (1 cm x 1cm), probes to bone, no purulence or bloody drainage. Podiatry on board, was debrided earlier in the day. XR R toe concerning for possible osteo of distal phalanx. S/o vanco 1g x1 and zosyn 3.375 x1 in ED  -high suspicion for osteo at this time  -f/u toe MRI to r/o osteo  -c/w empiric vanc/zosyn for now given chronicity of lesion and concern for osteo  -podiatry on board, appreciate recs  -f/u BCx  -f/u PT eval  -f/u daily CBC, ESR, CRP

## 2017-01-16 VITALS
DIASTOLIC BLOOD PRESSURE: 84 MMHG | OXYGEN SATURATION: 99 % | HEART RATE: 58 BPM | TEMPERATURE: 98 F | SYSTOLIC BLOOD PRESSURE: 120 MMHG | RESPIRATION RATE: 18 BRPM

## 2017-01-16 LAB
ANION GAP SERPL CALC-SCNC: 10 MMOL/L — SIGNIFICANT CHANGE UP (ref 9–16)
BUN SERPL-MCNC: 12 MG/DL — SIGNIFICANT CHANGE UP (ref 7–23)
CALCIUM SERPL-MCNC: 8.6 MG/DL — SIGNIFICANT CHANGE UP (ref 8.5–10.5)
CHLORIDE SERPL-SCNC: 105 MMOL/L — SIGNIFICANT CHANGE UP (ref 96–108)
CO2 SERPL-SCNC: 23 MMOL/L — SIGNIFICANT CHANGE UP (ref 22–31)
CREAT SERPL-MCNC: 0.83 MG/DL — SIGNIFICANT CHANGE UP (ref 0.5–1.3)
GLUCOSE SERPL-MCNC: 98 MG/DL — SIGNIFICANT CHANGE UP (ref 70–99)
GRAM STN FLD: SIGNIFICANT CHANGE UP
HCT VFR BLD CALC: 39.1 % — SIGNIFICANT CHANGE UP (ref 39–50)
HGB BLD-MCNC: 13.3 G/DL — SIGNIFICANT CHANGE UP (ref 13–17)
INR BLD: 1.26 — HIGH (ref 0.88–1.16)
MAGNESIUM SERPL-MCNC: 2.3 MG/DL — SIGNIFICANT CHANGE UP (ref 1.6–2.4)
MCHC RBC-ENTMCNC: 30.6 PG — SIGNIFICANT CHANGE UP (ref 27–34)
MCHC RBC-ENTMCNC: 34 G/DL — SIGNIFICANT CHANGE UP (ref 32–36)
MCV RBC AUTO: 90.1 FL — SIGNIFICANT CHANGE UP (ref 80–100)
PLATELET # BLD AUTO: 205 K/UL — SIGNIFICANT CHANGE UP (ref 150–400)
POTASSIUM SERPL-MCNC: 4.2 MMOL/L — SIGNIFICANT CHANGE UP (ref 3.5–5.3)
POTASSIUM SERPL-SCNC: 4.2 MMOL/L — SIGNIFICANT CHANGE UP (ref 3.5–5.3)
PROTHROM AB SERPL-ACNC: 14 SEC — HIGH (ref 10–13.1)
RBC # BLD: 4.34 M/UL — SIGNIFICANT CHANGE UP (ref 4.2–5.8)
RBC # FLD: 12.1 % — SIGNIFICANT CHANGE UP (ref 10.3–16.9)
SODIUM SERPL-SCNC: 138 MMOL/L — SIGNIFICANT CHANGE UP (ref 135–145)
SPECIMEN SOURCE: SIGNIFICANT CHANGE UP
WBC # BLD: 8.2 K/UL — SIGNIFICANT CHANGE UP (ref 3.8–10.5)
WBC # FLD AUTO: 8.2 K/UL — SIGNIFICANT CHANGE UP (ref 3.8–10.5)

## 2017-01-16 PROCEDURE — 73660 X-RAY EXAM OF TOE(S): CPT

## 2017-01-16 PROCEDURE — 73720 MRI LWR EXTREMITY W/O&W/DYE: CPT

## 2017-01-16 PROCEDURE — 83735 ASSAY OF MAGNESIUM: CPT

## 2017-01-16 PROCEDURE — 96374 THER/PROPH/DIAG INJ IV PUSH: CPT

## 2017-01-16 PROCEDURE — 85730 THROMBOPLASTIN TIME PARTIAL: CPT

## 2017-01-16 PROCEDURE — 97001: CPT

## 2017-01-16 PROCEDURE — 36415 COLL VENOUS BLD VENIPUNCTURE: CPT

## 2017-01-16 PROCEDURE — 80053 COMPREHEN METABOLIC PANEL: CPT

## 2017-01-16 PROCEDURE — 87186 SC STD MICRODIL/AGAR DIL: CPT

## 2017-01-16 PROCEDURE — 85027 COMPLETE CBC AUTOMATED: CPT

## 2017-01-16 PROCEDURE — 80048 BASIC METABOLIC PNL TOTAL CA: CPT

## 2017-01-16 PROCEDURE — 87075 CULTR BACTERIA EXCEPT BLOOD: CPT

## 2017-01-16 PROCEDURE — 80202 ASSAY OF VANCOMYCIN: CPT

## 2017-01-16 PROCEDURE — 86901 BLOOD TYPING SEROLOGIC RH(D): CPT

## 2017-01-16 PROCEDURE — 99238 HOSP IP/OBS DSCHRG MGMT 30/<: CPT

## 2017-01-16 PROCEDURE — 88304 TISSUE EXAM BY PATHOLOGIST: CPT

## 2017-01-16 PROCEDURE — 86850 RBC ANTIBODY SCREEN: CPT

## 2017-01-16 PROCEDURE — 36430 TRANSFUSION BLD/BLD COMPNT: CPT

## 2017-01-16 PROCEDURE — 99285 EMERGENCY DEPT VISIT HI MDM: CPT | Mod: 25

## 2017-01-16 PROCEDURE — 88311 DECALCIFY TISSUE: CPT

## 2017-01-16 PROCEDURE — 87070 CULTURE OTHR SPECIMN AEROBIC: CPT

## 2017-01-16 PROCEDURE — 86140 C-REACTIVE PROTEIN: CPT

## 2017-01-16 PROCEDURE — 87040 BLOOD CULTURE FOR BACTERIA: CPT

## 2017-01-16 PROCEDURE — 86900 BLOOD TYPING SEROLOGIC ABO: CPT

## 2017-01-16 PROCEDURE — A9585: CPT

## 2017-01-16 PROCEDURE — 83036 HEMOGLOBIN GLYCOSYLATED A1C: CPT

## 2017-01-16 PROCEDURE — 93005 ELECTROCARDIOGRAM TRACING: CPT

## 2017-01-16 PROCEDURE — 85652 RBC SED RATE AUTOMATED: CPT

## 2017-01-16 PROCEDURE — 81003 URINALYSIS AUTO W/O SCOPE: CPT

## 2017-01-16 PROCEDURE — 85025 COMPLETE CBC W/AUTO DIFF WBC: CPT

## 2017-01-16 PROCEDURE — 81001 URINALYSIS AUTO W/SCOPE: CPT

## 2017-01-16 PROCEDURE — 71046 X-RAY EXAM CHEST 2 VIEWS: CPT

## 2017-01-16 PROCEDURE — 93971 EXTREMITY STUDY: CPT

## 2017-01-16 PROCEDURE — 82607 VITAMIN B-12: CPT

## 2017-01-16 PROCEDURE — 85610 PROTHROMBIN TIME: CPT

## 2017-01-16 RX ORDER — BENAZEPRIL HYDROCHLORIDE 40 MG/1
1 TABLET ORAL
Qty: 0 | Refills: 0 | COMMUNITY

## 2017-01-16 RX ORDER — WARFARIN SODIUM 2.5 MG/1
1 TABLET ORAL
Qty: 0 | Refills: 0 | COMMUNITY

## 2017-01-16 RX ORDER — EPLERENONE 50 MG/1
1 TABLET, FILM COATED ORAL
Qty: 0 | Refills: 0 | COMMUNITY

## 2017-01-16 RX ORDER — NEBIVOLOL HYDROCHLORIDE 5 MG/1
1 TABLET ORAL
Qty: 0 | Refills: 0 | COMMUNITY

## 2017-01-16 RX ADMIN — Medication 1 TABLET(S): at 11:23

## 2017-01-16 RX ADMIN — PIPERACILLIN AND TAZOBACTAM 200 GRAM(S): 4; .5 INJECTION, POWDER, LYOPHILIZED, FOR SOLUTION INTRAVENOUS at 00:43

## 2017-01-16 RX ADMIN — Medication 250 MILLIGRAM(S): at 00:43

## 2017-01-16 RX ADMIN — Medication 100 MILLIGRAM(S): at 11:22

## 2017-01-16 RX ADMIN — PIPERACILLIN AND TAZOBACTAM 200 GRAM(S): 4; .5 INJECTION, POWDER, LYOPHILIZED, FOR SOLUTION INTRAVENOUS at 06:51

## 2017-01-16 NOTE — PROGRESS NOTE ADULT - PROBLEM SELECTOR PLAN 1
- Closure site coapting well with brisk capillary flow,   -Dry sterile dressing, gauze, kerlex, ace bandage   - on discharge patient can change dressing every other day, keep dry,  clean,   -NO weightbearing restrictions.  Patient can weight bear as tolerated with SURGICAL SHOE  - DC on augmentin 875mg bid x 2 weeks  - for pain control at home recommend, ibuprofen otc for mild pain, percocet 5/325 for moderate pain   Contact Dr. Willson for any purulent drainage, trauma to toe, streaking, dehiscence or other local signs of infection.   Follow up with Dr. Willson in 1 week after discharge

## 2017-01-16 NOTE — DISCHARGE NOTE ADULT - CARE PROVIDER_API CALL
Chuck Bolaños), Cardiovascular Disease; Internal Medicine  [address] [place] [address]  Phone: [phone]  Fax: [phone] Chuck Bolaños), Cardiovascular Disease; Internal Medicine  [address] [place] [address]  Phone: [phone]  Fax: [phone]    Beth Willson (BELLA), Surgery Orthopaedic Surgery  [address]  [place] [address]  Phone: [phone]  Fax: [phone]

## 2017-01-16 NOTE — DISCHARGE NOTE ADULT - MEDICATION SUMMARY - MEDICATIONS TO TAKE
I will START or STAY ON the medications listed below when I get home from the hospital:    eplerenone 25 mg oral tablet  -- 1 tab(s) by mouth once a day  -- Indication: For Hypertension    benazepril 20 mg oral tablet  -- 1 tab(s) by mouth once a day  -- Indication: For Hypertension    warfarin 7.5 mg oral tablet  -- 1 tab(s) by mouth once a day  -- Indication: For Atrial fibrillation    Bystolic 5 mg oral tablet  -- 1 tab(s) by mouth once a day  -- Indication: For Atrial fibrillation    amoxicillin-clavulanate 875 mg-125 mg oral tablet  -- 1 tab(s) by mouth every 12 hours  -- Indication: For Right toe osteomyelitis    Multiple Vitamins oral tablet  -- 1 tab(s) by mouth once a day  -- Indication: For Right toe osteomyelitis I will START or STAY ON the medications listed below when I get home from the hospital:    eplerenone 25 mg oral tablet  -- 1 tab(s) by mouth once a day  -- Indication: For Hypertension    Percocet 5/325 325 mg-5 mg oral tablet  -- 1 tab(s) by mouth every 4 hours, As Needed -for severe pain MDD:6  -- Caution federal law prohibits the transfer of this drug to any person other  than the person for whom it was prescribed.  May cause drowsiness.  Alcohol may intensify this effect.  Use care when operating dangerous machinery.  This prescription cannot be refilled.  This product contains acetaminophen.  Do not use  with any other product containing acetaminophen to prevent possible liver damage.  Using more of this medication than prescribed may cause serious breathing problems.    -- Indication: For Pain    benazepril 20 mg oral tablet  -- 1 tab(s) by mouth once a day  -- Indication: For Hypertension    warfarin 7.5 mg oral tablet  -- 1 tab(s) by mouth once a day  -- Indication: For Atrial fibrillation    Bystolic 5 mg oral tablet  -- 1 tab(s) by mouth once a day  -- Indication: For Atrial fibrillation    amoxicillin-clavulanate 875 mg-125 mg oral tablet  -- 1 tab(s) by mouth every 12 hours  -- Indication: For Right toe osteomyelitis    Multiple Vitamins oral tablet  -- 1 tab(s) by mouth once a day  -- Indication: For Right toe osteomyelitis

## 2017-01-16 NOTE — PHYSICAL THERAPY INITIAL EVALUATION ADULT - PERTINENT HX OF CURRENT PROBLEM, REHAB EVAL
Presented to ED with worsening appearance of R 2nd metatarsal on advice of patient's podiatrist. Relevant PMH includes chronic ulcer on R 2nd distal phalanx, Afib (on coumadin), HTN

## 2017-01-16 NOTE — DISCHARGE NOTE ADULT - HOSPITAL COURSE
66 M w/ PMH chronic ulcer on R 2nd distal phalanx, Afib (on coumadin) presented from home on 1/12 following advice of patient's outpatient podiatrist (Dr. Willson) due to ulcer on R second metatarsal since December. As per podiatrist instructions, patient had been using topical mupirocin and performing daily dressing changes for the ulcer. Two days prior to presentation to the ED, patient had noted worsening appearance of ulcer without pain, bleeding, or drainage.   ED Course: Patient negative for fevers, chills, pain of foot, difficulty ambulating, weakness. ROS positive for RLE edema. Vitals upon admission were VS Tmax 99.2, -158/81, HR 80-92, RR 18-22 O2 sat 97-98% on RA. Received vancomycin 1g x 1 and zosyn 3.375 g x1 in ED. (12 Jan 2017 17:16)    Patient was admitted to [facility] and continued on IV Vanc/Zosyn. Given CHADSVASC of 1, warfarin was held without heparin to bridge as patient awaited possible amputation of left 2nd toe with podiatry. Amputation was performed without complications on 1/15 after FFP given to bring down INR. Patient was started on morphine and percocet PRN for pain control. Patient's infection responded favorably to IV antibiotics and procedure, and patient remained hemodynamically stable for discharge on 1/16/2017. Patient was instructed to follow up with Dr. Willson (podiatry) in one week and to complete a 14 day course of oral Augmentin. Restarted warfarin on discharge, will f/up with PMD to discuss whether to continue warfarin given CHADSVASC score of 1.

## 2017-01-16 NOTE — PROGRESS NOTE ADULT - PROBLEM SELECTOR PROBLEM 2
Lower extremity edema
Atrial fibrillation, unspecified type
Atrial fibrillation, unspecified type
Lower extremity edema
Ulcer of toe, right, with unspecified severity
Atrial fibrillation, unspecified type

## 2017-01-16 NOTE — PROGRESS NOTE ADULT - SUBJECTIVE AND OBJECTIVE BOX
Patient is a 66y old  Male who presents with a chief complaint of r/o osteo (16 Jan 2017 12:01)      INTERVAL HPI/OVERNIGHT EVENTS:    c/o mild R foot pain s/p amputation, controlled w/ Percocet  No F/C    Review of Systems: 12 point review of systems otherwise negative    MEDICATIONS  (STANDING):  nebivolol 5milliGRAM(s) Oral daily  benazepril 20milliGRAM(s) Oral daily  multivitamin 1Tablet(s) Oral daily  ascorbic acid 100milliGRAM(s) Oral daily  amoxicillin  875 milliGRAM(s)/clavulanate 1Tablet(s) Oral every 12 hours    MEDICATIONS  (PRN):  morphine  - Injectable 2milliGRAM(s) IV Push every 4 hours PRN Severe Pain (7 - 10)  oxyCODONE  5 mG/acetaminophen 325 mG 1Tablet(s) Oral every 4 hours PRN Moderate Pain (4 - 6)      Allergies    No Known Allergies    Intolerances          Vital Signs Last 24 Hrs  T(C): 36.6, Max: 36.7 (01-15 @ 21:05)  T(F): 97.9, Max: 98 (01-15 @ 21:05)  HR: 58 (58 - 83)  BP: 120/84 (101/65 - 122/84)  BP(mean): --  RR: 18 (16 - 20)  SpO2: 99% (98% - 99%)  CAPILLARY BLOOD GLUCOSE      I & Os for current day (as of 01-16 @ 16:16)  =============================================  IN: 600 ml / OUT: 1300 ml / NET: -700 ml      Physical Exam:    Daily     Daily   General:  Well appearing, NAD, non-toxic  HEENT: MMM  Abdomen:  Soft, NT ND  Extremities:  R foot dressing C/D/I  Skin:  Warm and dry  Neuro:  AAOx3    LABS:                        13.3   8.2   )-----------( 205      ( 16 Jan 2017 08:05 )             39.1     16 Jan 2017 08:05    138    |  105    |  12     ----------------------------<  98     4.2     |  23     |  0.83     Ca    8.6        16 Jan 2017 08:05  Mg     2.3       16 Jan 2017 08:05      PT/INR - ( 16 Jan 2017 08:05 )   PT: 14.0 sec;   INR: 1.26                  RADIOLOGY & ADDITIONAL TESTS:    ---------------------------------------------------------------------------  I personally reviewed: [  ]EKG   [  ]CXR    [  ] CT    [  ]Other  ---------------------------------------------------------------------------  PLEASE CHECK WHEN PRESENT:     [  ]Heart Failure     [  ] Acute     [  ] Acute on Chronic     [  ] Chronic  -------------------------------------------------------------------     [  ]Diastolic [HFpEF]     [  ]Systolic [HFrEF]     [  ]Combined [HFpEF & HFrEF]     [  ]Other:  -------------------------------------------------------------------  [  ]JUAN CARLOS     [  ]ATN     [  ]Reneal Medullary Necrosis     [  ]Renal Cortical Necrosis     [  ]Other Pathological Lesions:    [  ]CKD 1  [  ]CKD 2  [  ]CKD 3  [  ]CKD 4  [  ]CKD 5  [  ]Other  -------------------------------------------------------------------  [  ]Other/Unspecified:    --------------------------------------------------------------------    Abdominal Nutritional Status  [  ]Malnutrition: See Nutrition Note  [  ]Cachexia  [  ]Other:   [  ]Supplement Ordered:  [  ]Morbid Obesity (BMI >=40]

## 2017-01-16 NOTE — DISCHARGE NOTE ADULT - SECONDARY DIAGNOSIS.
Atrial fibrillation Essential hypertension Pre-diabetes History of deep vein thrombophlebitis of lower extremity

## 2017-01-16 NOTE — PHYSICAL THERAPY INITIAL EVALUATION ADULT - ADDITIONAL COMMENTS
Patient lives with girlfriend in an elevator apartment with no steps to enter. Prior to admission, patient was independent for all functional mobility without assistive device.

## 2017-01-16 NOTE — PROGRESS NOTE ADULT - PROBLEM SELECTOR PLAN 4
Currently normotensive on meds. Takes benazapril 20 mg and eplerenone 25mg at home.  - Refused lisinopril therapeutic interchange  - Continue home benazepril 20mg daily with nebivolol 5mg daily  - Hold eplerenone while in hospital, restart on discharge
Currently normotensive on meds. Takes benazapril 20 mg and eplerenone 25mg at home.  - Continue home benazepril 20mg daily with nebivolol 5mg daily  - Hold eplerenone while in hospital, restart on discharge
due to toe infection  (-) DVT on u/s
repeat HbA1c in 3 months after trial of lifestyle modifications

## 2017-01-16 NOTE — PROGRESS NOTE ADULT - PROBLEM SELECTOR PLAN 1
s/p amputation R 2nd toe; per Podiatry recs, can change abx to P.O. Augmentin x2 weeks (f/u cultures); cont wound care per recs, surgical shoe WBAT, pain control

## 2017-01-16 NOTE — DISCHARGE NOTE ADULT - PLAN OF CARE
- Patient can change dressing every other day. Keep dry, clean,   - NO weightbearing restrictions. Patient can weight bear as tolerated while wearing SURGICAL SHOE on right foot.  - Complete oral antibiotics as prescribed - Augmentin 875mg twice a day x 2 weeks  - For pain control at home patient may use Ibuprofen (over the counter) for mild pain, Percocet 5/325 for moderate pain   - Contact Dr. Willson for any purulent drainage, trauma to toe, streaking, dehiscence or other local signs of infection. Follow up with Dr. Willson in 1 week after discharge. Prevention of further infection after amputation. Please restart home dose of warfarin and follow up with Dr. Chuck Bolaños within one week to check your INR to adjust coumadin. It is very important that you follow up with him to make sure your coumadin is closely monitored to prevent complications of bleeding. Your CHADSVASC score is 1, please speak to Dr. Bolaños about this entails. Continue nebivolol/benazepril/eplerenone and follow up with Dr. Bolaños. Your HbA1c was 6.4, indicating pre-diabetes (which ranges from 5.7 to 6.4). You stated you previously had a HbA1c of 7 which means you did have diabetes, but you lost weight over the past few months.

## 2017-01-16 NOTE — PROGRESS NOTE ADULT - PROBLEM SELECTOR PROBLEM 3
Neuropathy
Essential hypertension
Essential hypertension
Neuropathy
Preop cardiovascular exam
Essential hypertension

## 2017-01-16 NOTE — PROGRESS NOTE ADULT - PROBLEM SELECTOR PLAN 3
Patient w/ decreased sensation b/l feet, R>L. 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 Neuro exam otherwise WNL. No hx of DM per patient.  -f/u AM HgbA1C and B12
Patient w/ decreased sensation b/l feet, R>L. 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 Neuro exam otherwise WNL. No hx of DM per patient.  - HbA1c of 6.4, previously 7 as per patient. Possibly diabetic neuropathy. Vitamin B12 within normal limits.
cont. ACE-I, low-salt diet
cont. ACE-I, low-salt diet
pt w/o evidence of ACS, decompensated CHF or arrythmia   METS> 4  RCRI = 1  pt ok to proceed w/ surgery w/o additional cardiac testing  pt is at low risk for intermediate risk surgery  c/w bystolic  eplerenone and warfarin held
cont. ACE-I, low-salt diet

## 2017-01-16 NOTE — PROGRESS NOTE ADULT - PROBLEM SELECTOR PROBLEM 1
Cellulitis of toe
Acute osteomyelitis of right foot
Acute osteomyelitis of right foot
Cellulitis of toe
Cellulitis of toe
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

## 2017-01-16 NOTE — PHYSICAL THERAPY INITIAL EVALUATION ADULT - MANUAL MUSCLE TESTING RESULTS, REHAB EVAL
Strength grossly at least 3+/5 based on functional assessment of bed mobility, transfers, ambulation and stair negotiation

## 2017-01-16 NOTE — DISCHARGE NOTE ADULT - NS AS DC VTE INSTRUCTION
Coumadin/Warfarin - Potential for adverse drug reactions and interactions/Coumadin/Warfarin - Dietary Advice.../Coumadin/Warfarin - Compliance.../Coumadin/Warfarin - Follow-up monitoring...

## 2017-01-16 NOTE — DISCHARGE NOTE ADULT - CARE PROVIDERS DIRECT ADDRESSES
[email or web address],[email or web address]

## 2017-01-16 NOTE — DISCHARGE NOTE ADULT - PATIENT PORTAL LINK FT
“You can access the [facility] Patient Portal, offered by [facility], by registering with the following website: [email or web address]”

## 2017-01-16 NOTE — PROGRESS NOTE ADULT - PROBLEM SELECTOR PLAN 2
Pt w/ hx of provoked DVT in 2015, doppler US negative for DVT. Patient with non pitting edema to R calf.  - Maintain appropriate VTE prophylaxis
cont. beta-blocker; holding coumadin perioperatively; will give FFP before surgery as INR still > 1.4
Pt w/ hx of provoked DVT in 2015, doppler US negative for DVT. Patient with non pitting edema to R calf.  - Maintain appropriate VTE prophylaxis. Currently therapeutic INR, holding coumadin.  - Start HSQ when INR < 2
as above  c/w wound care
cont. beta-blocker; restart coumadin, no need to bridge
cont. beta-blocker; holding coumadin perioperatively; monitor INR

## 2017-01-16 NOTE — DISCHARGE NOTE ADULT - CARE PLAN
Principal Discharge DX:	Osteomyelitis of forefoot  Secondary Diagnosis:	Atrial fibrillation  Secondary Diagnosis:	Essential hypertension  Secondary Diagnosis:	Pre-diabetes  Secondary Diagnosis:	History of deep vein thrombophlebitis of lower extremity Principal Discharge DX:	Osteomyelitis of forefoot  Goal:	Prevention of further infection after amputation.  Instructions for follow-up, activity and diet:	- Patient can change dressing every other day. Keep dry, clean,   - NO weightbearing restrictions. Patient can weight bear as tolerated while wearing SURGICAL SHOE on right foot.  - Complete oral antibiotics as prescribed - Augmentin 875mg twice a day x 2 weeks  - For pain control at home patient may use Ibuprofen (over the counter) for mild pain, Percocet 5/325 for moderate pain   - Contact Dr. Willson for any purulent drainage, trauma to toe, streaking, dehiscence or other local signs of infection. Follow up with Dr. Willson in 1 week after discharge.  Secondary Diagnosis:	Atrial fibrillation  Instructions for follow-up, activity and diet:	Please restart home dose of warfarin and follow up with Dr. Chuck Bolaños within one week to check your INR to adjust coumadin. It is very important that you follow up with him to make sure your coumadin is closely monitored to prevent complications of bleeding. Your CHADSVASC score is 1, please speak to Dr. Bolaños about this entails.  Secondary Diagnosis:	Essential hypertension  Instructions for follow-up, activity and diet:	Continue nebivolol/benazepril/eplerenone and follow up with Dr. Bolaños.  Secondary Diagnosis:	Pre-diabetes  Instructions for follow-up, activity and diet:	Your HbA1c was 6.4, indicating pre-diabetes (which ranges from 5.7 to 6.4). You stated you previously had a HbA1c of 7 which means you did have diabetes, but you lost weight over the past few months.  Secondary Diagnosis:	History of deep vein thrombophlebitis of lower extremity

## 2017-01-16 NOTE — PROGRESS NOTE ADULT - ASSESSMENT
66 year old male with idiopathic neuropathy and right 2nd toe distal tip ulcer with underlying osteomyelitis, now s/p partial 2nd toe amputation with primary closure.

## 2017-01-16 NOTE — PROGRESS NOTE ADULT - SUBJECTIVE AND OBJECTIVE BOX
PROGRESS NOTE     Patient is 1 day s/p Right 2nd digit partial amputation w/ primary closure. States pain is well controlled with only one percocet in the middle of the night. Denies pain at this moment. Has not been weight-bearing since the surgery. 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 Patient has h/o of prior ulcers on b/l feet. Has had ulcer on R second metatarsal since 12/2016. Had been using topical mupirocin and daily dressing changes for ulcer per podiatrist. In last 2 days, patient had been noticing worsening appearance of toe in absence of pain, bleeding, drainage or other sx. Saw outpatient podiatrist today who recommended that patient come to ED. ROS negative for fevers, chills, pain of foot, difficulty ambulating, weakness. ROS positive for RLE edema. Podiatry service consulted by ED.     In ED: VS Tmax 99.2, -158/81, HR 80-92, RR 18-22 O2 sat 97-98% on RA. Received vancomycin 1g x 1 and zosyn 3.375 g x1 in ED. (12 Jan 2017 17:16)      Vital Signs Last 24 Hrs  T(C): 36.6, Max: 36.8 (01-15 @ 14:36)  T(F): 97.9, Max: 98.3 (01-15 @ 14:36)  HR: 58 (58 - 83)  BP: 120/84 (101/65 - 126/79)  BP(mean): --  RR: 18 (16 - 20)  SpO2: 99% (97% - 99%)                          13.3   8.2   )-----------( 205      ( 16 Jan 2017 08:05 )             39.1               16 Jan 2017 08:05    138    |  105    |  12     ----------------------------<  98     4.2     |  23     |  0.83     Ca    8.6        16 Jan 2017 08:05  Mg     2.3       16 Jan 2017 08:05        PHYSICAL EXAM  GEN: ROSALIE DE LUNA is a pleasant well-nourished, well developed 66y Male in no acute distress, alert awake, and oriented to person, place and time.   LE Focused:  Dressing clean dry intact, no strikethrough. surgical site 2nd right toe well coapted, no dehiscence, cap refill to flap is brisk. no tenderness to palpation no fluctuance or drainage.

## 2017-01-16 NOTE — PHYSICAL THERAPY INITIAL EVALUATION ADULT - THERAPY FREQUENCY, PT EVAL
DC PT - Patient is at functional baseline and is independent for all functional mobility including bed mobility, transfers, ambulation and stair negotiation without assistive device. Patient has no further skilled acute PT needs. Patient is safe to return home. DC PT

## 2017-01-16 NOTE — PHYSICAL THERAPY INITIAL EVALUATION ADULT - GENERAL OBSERVATIONS, REHAB EVAL
Received supine in bed with +hep lock, +R surgical shoe, +2nd Metatarsal dressing, on room air, in no apparent distress. Patient denies pain

## 2017-01-16 NOTE — PROGRESS NOTE ADULT - ATTENDING COMMENTS
Dispo: d/c home today w/ outpatient Podiatry and Cardiology f/u  I-STOP reviewed; I wrote rx for Percocet PRN  case d/w Podiatry resident
Dispo: OR today  case d/w Podiatry resident
Dispo: amputation pending MRI

## 2017-01-17 LAB
CULTURE RESULTS: SIGNIFICANT CHANGE UP
CULTURE RESULTS: SIGNIFICANT CHANGE UP
SPECIMEN SOURCE: SIGNIFICANT CHANGE UP
SPECIMEN SOURCE: SIGNIFICANT CHANGE UP

## 2017-01-18 LAB
-  AMPICILLIN/SULBACTAM: SIGNIFICANT CHANGE UP
-  AMPICILLIN: SIGNIFICANT CHANGE UP
-  CEFAZOLIN: SIGNIFICANT CHANGE UP
-  CEFTRIAXONE: SIGNIFICANT CHANGE UP
-  CIPROFLOXACIN: SIGNIFICANT CHANGE UP
-  GENTAMICIN: SIGNIFICANT CHANGE UP
-  PIPERACILLIN/TAZOBACTAM: SIGNIFICANT CHANGE UP
-  TOBRAMYCIN: SIGNIFICANT CHANGE UP
-  TRIMETHOPRIM/SULFAMETHOXAZOLE: SIGNIFICANT CHANGE UP
CULTURE RESULTS: SIGNIFICANT CHANGE UP
METHOD TYPE: SIGNIFICANT CHANGE UP
ORGANISM # SPEC MICROSCOPIC CNT: SIGNIFICANT CHANGE UP
ORGANISM # SPEC MICROSCOPIC CNT: SIGNIFICANT CHANGE UP
SPECIMEN SOURCE: SIGNIFICANT CHANGE UP
SURGICAL PATHOLOGY STUDY: SIGNIFICANT CHANGE UP

## 2017-01-19 DIAGNOSIS — M86.8X7 OTHER OSTEOMYELITIS, ANKLE AND FOOT: ICD-10-CM

## 2017-01-19 DIAGNOSIS — I48.91 UNSPECIFIED ATRIAL FIBRILLATION: ICD-10-CM

## 2017-01-19 DIAGNOSIS — L97.519 NON-PRESSURE CHRONIC ULCER OF OTHER PART OF RIGHT FOOT WITH UNSPECIFIED SEVERITY: ICD-10-CM

## 2017-01-19 DIAGNOSIS — J98.11 ATELECTASIS: ICD-10-CM

## 2017-01-19 DIAGNOSIS — L03.031 CELLULITIS OF RIGHT TOE: ICD-10-CM

## 2017-01-19 DIAGNOSIS — R73.03 PREDIABETES: ICD-10-CM

## 2017-01-19 DIAGNOSIS — Z86.72 PERSONAL HISTORY OF THROMBOPHLEBITIS: ICD-10-CM

## 2017-01-19 DIAGNOSIS — I10 ESSENTIAL (PRIMARY) HYPERTENSION: ICD-10-CM

## 2017-01-19 DIAGNOSIS — L97.514 NON-PRESSURE CHRONIC ULCER OF OTHER PART OF RIGHT FOOT WITH NECROSIS OF BONE: ICD-10-CM

## 2017-01-19 DIAGNOSIS — G62.9 POLYNEUROPATHY, UNSPECIFIED: ICD-10-CM

## 2017-01-19 LAB
CULTURE RESULTS: NO GROWTH — SIGNIFICANT CHANGE UP
SPECIMEN SOURCE: SIGNIFICANT CHANGE UP

## 2017-05-31 NOTE — PROGRESS NOTE ADULT - SUBJECTIVE AND OBJECTIVE BOX
Patient is scheduled for Bilateral TKA on 6/14/17 with Dr. Carr at [facility].  Please see discharge summary for anticoagulation instructions. Per Dr. Bruno crowell referral to [facility] Anticoagulation clinic entered in [facility] for post op care.   Patient is a 66y old  Male who presents with a chief complaint of r/o osteo (12 Jan 2017 17:16)    INTERVAL HPI/OVERNIGHT EVENTS:    toe pain - mild  afebrile  rt leg swelling and redness improved    ( -  )fevers/chills  ( - ) dyspnea  (  - ) cough  (  - ) chest pain  (  - ) palpatations  ( - ) dizziness/lightheadedness  (  - ) nausea/vomiting  (  - ) abd pain  (  - ) diarrhea  (  - ) melena  (  - ) hematochezia  (  - ) dysuria  ( - ) hematuria  (  - ) leg swelling  ( -) calf tenderness  (  - ) motor weakness     ( - ) back pain  ( + ) tolerating POs  ( + ) BM  ROS: 12 point review of systems otherwise negative    MEDICATIONS  (STANDING):  piperacillin/tazobactam IVPB. 3.375Gram(s) IV Intermittent every 6 hours  heparin  Injectable 5000Unit(s) SubCutaneous every 8 hours  nebivolol 5milliGRAM(s) Oral daily  benazepril 20milliGRAM(s) Oral daily    MEDICATIONS  (PRN):    Allergies    No Known Allergies    Intolerances        Vital Signs Last 24 Hrs  T(C): 37.3, Max: 37.6 (01-12 @ 22:56)  T(F): 99.1, Max: 99.6 (01-12 @ 22:56)  HR: 85 (75 - 92)  BP: 115/71 (115/71 - 146/84)  BP(mean): --  RR: 20 (17 - 20)  SpO2: 97% (96% - 98%)  CAPILLARY BLOOD GLUCOSE  152 (13 Jan 2017 16:42)  166 (13 Jan 2017 13:41)  121 (13 Jan 2017 07:44)    I&O's Summary    Physical Exam:    Daily Height in cm: 190.5 (12 Jan 2017 19:20)    Daily   General:  Well appearing,   HEENT:  Nonicteric, PERRLA, neck supple  CV: S1S2 nml,  RRR   Lungs:  CTA B/L, no wheezes, rhonchi, rales  Abdomen:  (+) bowel sounds, Soft, non-tender, non distended  Extremities:  2+ DP pulses b/l, 1+ rle edema   Back: no CVA tenderness, no midline vertebral tenderness  Skin:  right 2nd toe w/ ulcer distally w/ surrounding erythema, no purulence/bleeding.   :  No zelaya  Neuro:  AAOx3,  CN II-XII grossly intact ,   5/5 str all 4 extremities, sensation intact b/l,     LABS:                        14.3   11.8  )-----------( 222      ( 13 Jan 2017 11:08 )             41.4     12 Jan 2017 13:58    138    |  104    |  18     ----------------------------<  145    4.3     |  26     |  0.99     Ca    9.2        12 Jan 2017 13:58    TPro  7.4    /  Alb  3.3    /  TBili  0.4    /  DBili  x      /  AST  15     /  ALT  27     /  AlkPhos  79     12 Jan 2017 13:58    LIVER FUNCTIONS - ( 12 Jan 2017 13:58 )  Alb: 3.3 g/dL / Pro: 7.4 g/dL / ALK PHOS: 79 U/L / ALT: 27 U/L / AST: 15 U/L / GGT: x           PT/INR - ( 12 Jan 2017 13:58 )   PT: 27.7 sec;   INR: 2.47          PTT - ( 12 Jan 2017 13:58 )  PTT:35.9 sec

## 2018-08-13 PROBLEM — I48.91 UNSPECIFIED ATRIAL FIBRILLATION: Chronic | Status: ACTIVE | Noted: 2017-01-12

## 2018-08-13 PROBLEM — Z00.00 ENCOUNTER FOR PREVENTIVE HEALTH EXAMINATION: Status: ACTIVE | Noted: 2018-08-13

## 2018-08-14 ENCOUNTER — APPOINTMENT (OUTPATIENT)
Dept: VASCULAR SURGERY | Facility: CLINIC | Age: 68
End: 2018-08-14
Payer: MEDICARE

## 2018-08-14 DIAGNOSIS — S90.424A BLISTER (NONTHERMAL), RIGHT LESSER TOE(S), INITIAL ENCOUNTER: ICD-10-CM

## 2018-08-14 DIAGNOSIS — Z78.9 OTHER SPECIFIED HEALTH STATUS: ICD-10-CM

## 2018-08-14 DIAGNOSIS — Z86.718 PERSONAL HISTORY OF OTHER VENOUS THROMBOSIS AND EMBOLISM: ICD-10-CM

## 2018-08-14 DIAGNOSIS — Z86.79 PERSONAL HISTORY OF OTHER DISEASES OF THE CIRCULATORY SYSTEM: ICD-10-CM

## 2018-08-14 PROCEDURE — 99204 OFFICE O/P NEW MOD 45 MIN: CPT

## 2018-08-15 PROBLEM — Z78.9 NON-SMOKER: Status: ACTIVE | Noted: 2018-08-15

## 2018-08-15 PROBLEM — Z86.718 HISTORY OF DVT OF LOWER EXTREMITY: Status: RESOLVED | Noted: 2018-08-15 | Resolved: 2018-08-15

## 2018-08-15 PROBLEM — Z86.79 HISTORY OF ATRIAL FIBRILLATION: Status: RESOLVED | Noted: 2018-08-15 | Resolved: 2018-08-15

## 2018-08-15 PROBLEM — S90.424A BLISTER OF TOE OF RIGHT FOOT, INITIAL ENCOUNTER: Status: ACTIVE | Noted: 2018-08-15

## 2018-08-15 RX ORDER — WARFARIN 2.5 MG/1
2.5 TABLET ORAL
Qty: 90 | Refills: 0 | Status: ACTIVE | COMMUNITY
Start: 2018-05-15

## 2018-08-15 RX ORDER — BENAZEPRIL HYDROCHLORIDE 20 MG/1
20 TABLET, FILM COATED ORAL
Qty: 90 | Refills: 0 | Status: ACTIVE | COMMUNITY
Start: 2018-05-15

## 2018-08-15 RX ORDER — WARFARIN 5 MG/1
5 TABLET ORAL
Qty: 90 | Refills: 0 | Status: ACTIVE | COMMUNITY
Start: 2018-03-06

## 2018-08-15 RX ORDER — PANTOPRAZOLE 40 MG/1
40 TABLET, DELAYED RELEASE ORAL
Qty: 42 | Refills: 0 | Status: ACTIVE | COMMUNITY
Start: 2018-06-10

## 2018-08-15 RX ORDER — EPLERENONE 25 MG/1
25 TABLET, COATED ORAL
Qty: 90 | Refills: 0 | Status: ACTIVE | COMMUNITY
Start: 2018-05-15

## 2018-08-15 RX ORDER — WARFARIN 1 MG/1
1 TABLET ORAL
Qty: 175 | Refills: 0 | Status: ACTIVE | COMMUNITY
Start: 2018-06-10

## 2018-08-15 RX ORDER — NEBIVOLOL HYDROCHLORIDE 5 MG/1
5 TABLET ORAL
Qty: 30 | Refills: 0 | Status: ACTIVE | COMMUNITY
Start: 2018-05-03

## 2018-08-15 RX ORDER — AMOXICILLIN AND CLAVULANATE POTASSIUM 500; 125 MG/1; MG/1
500-125 TABLET, FILM COATED ORAL
Qty: 20 | Refills: 0 | Status: COMPLETED | COMMUNITY
Start: 2018-08-13

## 2023-12-27 NOTE — PROGRESS NOTE ADULT - ASSESSMENT
Hospitalist Progress Note    NAME:   Basim Gómez   : 1952   MRN: [id number]     Date/Time: 2023 7:21 PM  Patient PCP: Jose Ramon Lara MD      Assessment / Plan:    Left ankle fracture: Occurred after a mechanical fall, no loss of consciousness, dizziness  Plan for ORIF tomorrow  Chest x-ray reviewed, no acute pathology  EKG remains pending  MET >4, Denies any shortness of breath on exertion, denies any chest pain recently. No murmurs on examination  History of PCI with stents placed 2 years back, currently on aspirin only  EKG shows no ischemia  He is class 2 risk for surgery, has 6%, 30 day risk of death, MI or cardiac arrest.    Hx of CAD:  No recent chest pain on exertion or SOB on exertion  Continue aspirin statin    HTN:  Hyperlipidemia:  Continue home meds      Medical Decision Making:   I personally reviewed labs: yes  I personally reviewed imaging:CXR  I personally reviewed EKG: Ordered, but pending  Toxic drug monitoring:   Discussed case with: Orthopedics Estrellita CANELA Ser    Code Status: Full code  DVT Prophylaxis: As per primary team  GI Prophylaxis:    Subjective:   Seen and evaluated in the emergency room, he had a splint placed by orthopedics earlier  Has some pain  Denies any chest pain exertion or any shortness of breath on exertion  Was pretty functional prior to the event today,      Objective:     VITALS:   Last 24hrs VS reviewed since prior progress note.  Most recent are:  Patient Vitals for the past 24 hrs:   BP Temp Pulse Resp SpO2 Height Weight   23 1914 -- -- -- 16 -- -- --   23 1715 (!) 162/76 -- 64 13 -- -- --   23 1645 (!) 148/86 -- 69 12 96 % -- --   23 1610 (!) 140/76 -- 65 15 99 % -- --   23 1605 (!) 142/69 -- 64 10 99 % -- --   23 1600 139/75 -- 64 11 99 % -- --   23 1555 134/75 -- 64 14 99 % -- --   23 1550 133/66 -- 66 14 99 % -- --   23 1546 -- --  14 99 % -- --   23 1545 (!) 149/83 --  14 100 % -- 66 year old male with idiopathic neuropathy and right 2nd toe distal tip ulcer with exposed bone, clinical osteomyelitis